# Patient Record
Sex: FEMALE | Race: WHITE | NOT HISPANIC OR LATINO | Employment: OTHER | URBAN - METROPOLITAN AREA
[De-identification: names, ages, dates, MRNs, and addresses within clinical notes are randomized per-mention and may not be internally consistent; named-entity substitution may affect disease eponyms.]

---

## 2024-01-12 PROBLEM — K44.9 HIATAL HERNIA WITH GASTROESOPHAGEAL REFLUX: Status: ACTIVE | Noted: 2024-01-12

## 2024-01-12 PROBLEM — K21.9 HIATAL HERNIA WITH GASTROESOPHAGEAL REFLUX: Status: ACTIVE | Noted: 2024-01-12

## 2024-01-16 ENCOUNTER — HOSPITAL ENCOUNTER (INPATIENT)
Facility: HOSPITAL | Age: 74
LOS: 3 days | Discharge: HOME | DRG: 392 | End: 2024-01-20
Attending: INTERNAL MEDICINE | Admitting: INTERNAL MEDICINE
Payer: MEDICARE

## 2024-01-16 ENCOUNTER — APPOINTMENT (OUTPATIENT)
Dept: CARDIOLOGY | Facility: HOSPITAL | Age: 74
DRG: 392 | End: 2024-01-16
Payer: MEDICARE

## 2024-01-16 ENCOUNTER — APPOINTMENT (OUTPATIENT)
Dept: RADIOLOGY | Facility: HOSPITAL | Age: 74
DRG: 392 | End: 2024-01-16
Payer: MEDICARE

## 2024-01-16 DIAGNOSIS — R53.1 GENERALIZED WEAKNESS: Primary | ICD-10-CM

## 2024-01-16 DIAGNOSIS — R55 COLLAPSE: ICD-10-CM

## 2024-01-16 DIAGNOSIS — K21.9 HIATAL HERNIA WITH GASTROESOPHAGEAL REFLUX: ICD-10-CM

## 2024-01-16 DIAGNOSIS — K44.9 HIATAL HERNIA WITH GASTROESOPHAGEAL REFLUX: ICD-10-CM

## 2024-01-16 LAB
ALBUMIN SERPL BCP-MCNC: 3.3 G/DL (ref 3.4–5)
ALP SERPL-CCNC: 91 U/L (ref 33–136)
ALT SERPL W P-5'-P-CCNC: 17 U/L (ref 7–45)
ANION GAP SERPL CALC-SCNC: 9 MMOL/L (ref 10–20)
APPEARANCE UR: CLEAR
APTT PPP: 38 SECONDS (ref 27–38)
AST SERPL W P-5'-P-CCNC: 26 U/L (ref 9–39)
BASOPHILS # BLD AUTO: 0.02 X10*3/UL (ref 0–0.1)
BASOPHILS NFR BLD AUTO: 0.4 %
BILIRUB SERPL-MCNC: 0.8 MG/DL (ref 0–1.2)
BILIRUB UR STRIP.AUTO-MCNC: NEGATIVE MG/DL
BNP SERPL-MCNC: 88 PG/ML (ref 0–99)
BUN SERPL-MCNC: 10 MG/DL (ref 6–23)
CALCIUM SERPL-MCNC: 8.7 MG/DL (ref 8.6–10.3)
CARDIAC TROPONIN I PNL SERPL HS: 10 NG/L (ref 0–13)
CARDIAC TROPONIN I PNL SERPL HS: 7 NG/L (ref 0–13)
CHLORIDE SERPL-SCNC: 108 MMOL/L (ref 98–107)
CO2 SERPL-SCNC: 24 MMOL/L (ref 21–32)
COLOR UR: YELLOW
CREAT SERPL-MCNC: 0.65 MG/DL (ref 0.5–1.05)
EGFRCR SERPLBLD CKD-EPI 2021: >90 ML/MIN/1.73M*2
EOSINOPHIL # BLD AUTO: 0.02 X10*3/UL (ref 0–0.4)
EOSINOPHIL NFR BLD AUTO: 0.4 %
ERYTHROCYTE [DISTWIDTH] IN BLOOD BY AUTOMATED COUNT: 12.9 % (ref 11.5–14.5)
GLUCOSE SERPL-MCNC: 92 MG/DL (ref 74–99)
GLUCOSE UR STRIP.AUTO-MCNC: NEGATIVE MG/DL
HCT VFR BLD AUTO: 37.1 % (ref 36–46)
HGB BLD-MCNC: 12.3 G/DL (ref 12–16)
HYALINE CASTS #/AREA URNS AUTO: ABNORMAL /LPF
IMM GRANULOCYTES # BLD AUTO: 0.01 X10*3/UL (ref 0–0.5)
IMM GRANULOCYTES NFR BLD AUTO: 0.2 % (ref 0–0.9)
INR PPP: 1.1 (ref 0.9–1.1)
KETONES UR STRIP.AUTO-MCNC: NEGATIVE MG/DL
LEUKOCYTE ESTERASE UR QL STRIP.AUTO: ABNORMAL
LYMPHOCYTES # BLD AUTO: 1.68 X10*3/UL (ref 0.8–3)
LYMPHOCYTES NFR BLD AUTO: 31.1 %
MCH RBC QN AUTO: 31.4 PG (ref 26–34)
MCHC RBC AUTO-ENTMCNC: 33.2 G/DL (ref 32–36)
MCV RBC AUTO: 95 FL (ref 80–100)
MONOCYTES # BLD AUTO: 0.43 X10*3/UL (ref 0.05–0.8)
MONOCYTES NFR BLD AUTO: 7.9 %
MUCOUS THREADS #/AREA URNS AUTO: ABNORMAL /LPF
NEUTROPHILS # BLD AUTO: 3.25 X10*3/UL (ref 1.6–5.5)
NEUTROPHILS NFR BLD AUTO: 60 %
NITRITE UR QL STRIP.AUTO: NEGATIVE
NRBC BLD-RTO: 0 /100 WBCS (ref 0–0)
PH UR STRIP.AUTO: 5 [PH]
PLATELET # BLD AUTO: 216 X10*3/UL (ref 150–450)
POTASSIUM SERPL-SCNC: 3.9 MMOL/L (ref 3.5–5.3)
PROT SERPL-MCNC: 5.8 G/DL (ref 6.4–8.2)
PROT UR STRIP.AUTO-MCNC: NEGATIVE MG/DL
PROTHROMBIN TIME: 12.5 SECONDS (ref 9.8–12.8)
RBC # BLD AUTO: 3.92 X10*6/UL (ref 4–5.2)
RBC # UR STRIP.AUTO: NEGATIVE /UL
RBC #/AREA URNS AUTO: ABNORMAL /HPF
SODIUM SERPL-SCNC: 137 MMOL/L (ref 136–145)
SP GR UR STRIP.AUTO: 1.02
UROBILINOGEN UR STRIP.AUTO-MCNC: <2 MG/DL
WBC # BLD AUTO: 5.4 X10*3/UL (ref 4.4–11.3)
WBC #/AREA URNS AUTO: ABNORMAL /HPF

## 2024-01-16 PROCEDURE — 36415 COLL VENOUS BLD VENIPUNCTURE: CPT | Performed by: PHYSICIAN ASSISTANT

## 2024-01-16 PROCEDURE — 96365 THER/PROPH/DIAG IV INF INIT: CPT

## 2024-01-16 PROCEDURE — 81001 URINALYSIS AUTO W/SCOPE: CPT | Performed by: PHYSICIAN ASSISTANT

## 2024-01-16 PROCEDURE — 80053 COMPREHEN METABOLIC PANEL: CPT | Performed by: PHYSICIAN ASSISTANT

## 2024-01-16 PROCEDURE — 87086 URINE CULTURE/COLONY COUNT: CPT | Mod: PARLAB | Performed by: PHYSICIAN ASSISTANT

## 2024-01-16 PROCEDURE — 2550000001 HC RX 255 CONTRASTS: Performed by: INTERNAL MEDICINE

## 2024-01-16 PROCEDURE — 2500000004 HC RX 250 GENERAL PHARMACY W/ HCPCS (ALT 636 FOR OP/ED): Performed by: PHYSICIAN ASSISTANT

## 2024-01-16 PROCEDURE — 2500000001 HC RX 250 WO HCPCS SELF ADMINISTERED DRUGS (ALT 637 FOR MEDICARE OP): Performed by: PHYSICIAN ASSISTANT

## 2024-01-16 PROCEDURE — 71275 CT ANGIOGRAPHY CHEST: CPT | Performed by: RADIOLOGY

## 2024-01-16 PROCEDURE — 85730 THROMBOPLASTIN TIME PARTIAL: CPT | Performed by: PHYSICIAN ASSISTANT

## 2024-01-16 PROCEDURE — 96375 TX/PRO/DX INJ NEW DRUG ADDON: CPT

## 2024-01-16 PROCEDURE — 99222 1ST HOSP IP/OBS MODERATE 55: CPT | Performed by: PHYSICIAN ASSISTANT

## 2024-01-16 PROCEDURE — 74177 CT ABD & PELVIS W/CONTRAST: CPT | Performed by: RADIOLOGY

## 2024-01-16 PROCEDURE — 99285 EMERGENCY DEPT VISIT HI MDM: CPT | Performed by: INTERNAL MEDICINE

## 2024-01-16 PROCEDURE — 93005 ELECTROCARDIOGRAM TRACING: CPT

## 2024-01-16 PROCEDURE — 84484 ASSAY OF TROPONIN QUANT: CPT | Performed by: PHYSICIAN ASSISTANT

## 2024-01-16 PROCEDURE — G0378 HOSPITAL OBSERVATION PER HR: HCPCS

## 2024-01-16 PROCEDURE — 85025 COMPLETE CBC W/AUTO DIFF WBC: CPT | Performed by: PHYSICIAN ASSISTANT

## 2024-01-16 PROCEDURE — 83880 ASSAY OF NATRIURETIC PEPTIDE: CPT | Performed by: PHYSICIAN ASSISTANT

## 2024-01-16 PROCEDURE — 74177 CT ABD & PELVIS W/CONTRAST: CPT

## 2024-01-16 PROCEDURE — 85610 PROTHROMBIN TIME: CPT | Performed by: PHYSICIAN ASSISTANT

## 2024-01-16 PROCEDURE — 71275 CT ANGIOGRAPHY CHEST: CPT

## 2024-01-16 RX ORDER — ONDANSETRON HYDROCHLORIDE 2 MG/ML
4 INJECTION, SOLUTION INTRAVENOUS EVERY 8 HOURS PRN
Status: DISCONTINUED | OUTPATIENT
Start: 2024-01-16 | End: 2024-01-20 | Stop reason: HOSPADM

## 2024-01-16 RX ORDER — IBUPROFEN 200 MG
1 TABLET ORAL DAILY
Status: DISCONTINUED | OUTPATIENT
Start: 2024-01-17 | End: 2024-01-20 | Stop reason: HOSPADM

## 2024-01-16 RX ORDER — CEFTRIAXONE 1 G/50ML
1 INJECTION, SOLUTION INTRAVENOUS EVERY 24 HOURS
Status: DISCONTINUED | OUTPATIENT
Start: 2024-01-16 | End: 2024-01-17

## 2024-01-16 RX ORDER — ENOXAPARIN SODIUM 100 MG/ML
40 INJECTION SUBCUTANEOUS EVERY 24 HOURS
Status: DISCONTINUED | OUTPATIENT
Start: 2024-01-16 | End: 2024-01-20 | Stop reason: HOSPADM

## 2024-01-16 RX ORDER — PANTOPRAZOLE SODIUM 40 MG/10ML
40 INJECTION, POWDER, LYOPHILIZED, FOR SOLUTION INTRAVENOUS
Status: DISCONTINUED | OUTPATIENT
Start: 2024-01-17 | End: 2024-01-17

## 2024-01-16 RX ORDER — ACETAMINOPHEN 160 MG/5ML
650 SOLUTION ORAL EVERY 4 HOURS PRN
Status: DISCONTINUED | OUTPATIENT
Start: 2024-01-16 | End: 2024-01-19

## 2024-01-16 RX ORDER — ALUMINUM HYDROXIDE, MAGNESIUM HYDROXIDE, AND SIMETHICONE 1200; 120; 1200 MG/30ML; MG/30ML; MG/30ML
10 SUSPENSION ORAL ONCE
Status: COMPLETED | OUTPATIENT
Start: 2024-01-16 | End: 2024-01-16

## 2024-01-16 RX ORDER — FAMOTIDINE 10 MG/ML
40 INJECTION INTRAVENOUS ONCE
Status: COMPLETED | OUTPATIENT
Start: 2024-01-16 | End: 2024-01-16

## 2024-01-16 RX ORDER — ACETAMINOPHEN 650 MG/1
650 SUPPOSITORY RECTAL EVERY 4 HOURS PRN
Status: DISCONTINUED | OUTPATIENT
Start: 2024-01-16 | End: 2024-01-19

## 2024-01-16 RX ORDER — ACETAMINOPHEN 325 MG/1
650 TABLET ORAL EVERY 4 HOURS PRN
Status: DISCONTINUED | OUTPATIENT
Start: 2024-01-16 | End: 2024-01-19

## 2024-01-16 RX ORDER — MORPHINE SULFATE 4 MG/ML
4 INJECTION, SOLUTION INTRAMUSCULAR; INTRAVENOUS ONCE
Status: COMPLETED | OUTPATIENT
Start: 2024-01-16 | End: 2024-01-16

## 2024-01-16 RX ORDER — MORPHINE SULFATE 2 MG/ML
2 INJECTION, SOLUTION INTRAMUSCULAR; INTRAVENOUS EVERY 4 HOURS PRN
Status: DISCONTINUED | OUTPATIENT
Start: 2024-01-16 | End: 2024-01-19

## 2024-01-16 RX ORDER — POLYETHYLENE GLYCOL 3350 17 G/17G
17 POWDER, FOR SOLUTION ORAL DAILY PRN
Status: DISCONTINUED | OUTPATIENT
Start: 2024-01-16 | End: 2024-01-20 | Stop reason: HOSPADM

## 2024-01-16 RX ORDER — SODIUM CHLORIDE 9 MG/ML
125 INJECTION, SOLUTION INTRAVENOUS CONTINUOUS
Status: DISCONTINUED | OUTPATIENT
Start: 2024-01-16 | End: 2024-01-17

## 2024-01-16 RX ORDER — ONDANSETRON 4 MG/1
4 TABLET, FILM COATED ORAL EVERY 8 HOURS PRN
Status: DISCONTINUED | OUTPATIENT
Start: 2024-01-16 | End: 2024-01-20 | Stop reason: HOSPADM

## 2024-01-16 RX ADMIN — ALUMINUM HYDROXIDE, MAGNESIUM HYDROXIDE, AND SIMETHICONE 10 ML: 200; 200; 20 SUSPENSION ORAL at 21:34

## 2024-01-16 RX ADMIN — MORPHINE SULFATE 4 MG: 4 INJECTION, SOLUTION INTRAMUSCULAR; INTRAVENOUS at 18:03

## 2024-01-16 RX ADMIN — CEFTRIAXONE SODIUM 1 G: 1 INJECTION, SOLUTION INTRAVENOUS at 22:39

## 2024-01-16 RX ADMIN — IOHEXOL 75 ML: 350 INJECTION, SOLUTION INTRAVENOUS at 18:32

## 2024-01-16 RX ADMIN — SODIUM CHLORIDE 125 ML/HR: 9 INJECTION, SOLUTION INTRAVENOUS at 19:20

## 2024-01-16 RX ADMIN — MORPHINE SULFATE 4 MG: 4 INJECTION, SOLUTION INTRAMUSCULAR; INTRAVENOUS at 21:32

## 2024-01-16 RX ADMIN — FAMOTIDINE 40 MG: 10 INJECTION, SOLUTION INTRAVENOUS at 19:25

## 2024-01-16 ASSESSMENT — COLUMBIA-SUICIDE SEVERITY RATING SCALE - C-SSRS
6. HAVE YOU EVER DONE ANYTHING, STARTED TO DO ANYTHING, OR PREPARED TO DO ANYTHING TO END YOUR LIFE?: NO
1. IN THE PAST MONTH, HAVE YOU WISHED YOU WERE DEAD OR WISHED YOU COULD GO TO SLEEP AND NOT WAKE UP?: NO
2. HAVE YOU ACTUALLY HAD ANY THOUGHTS OF KILLING YOURSELF?: NO

## 2024-01-16 ASSESSMENT — LIFESTYLE VARIABLES
HAVE YOU EVER FELT YOU SHOULD CUT DOWN ON YOUR DRINKING: NO
HAVE PEOPLE ANNOYED YOU BY CRITICIZING YOUR DRINKING: NO
EVER FELT BAD OR GUILTY ABOUT YOUR DRINKING: NO
EVER HAD A DRINK FIRST THING IN THE MORNING TO STEADY YOUR NERVES TO GET RID OF A HANGOVER: NO
REASON UNABLE TO ASSESS: NO

## 2024-01-16 ASSESSMENT — PAIN - FUNCTIONAL ASSESSMENT: PAIN_FUNCTIONAL_ASSESSMENT: 0-10

## 2024-01-16 ASSESSMENT — PAIN DESCRIPTION - PROGRESSION: CLINICAL_PROGRESSION: NOT CHANGED

## 2024-01-16 ASSESSMENT — PAIN SCALES - GENERAL: PAINLEVEL_OUTOF10: 9

## 2024-01-16 NOTE — ED TRIAGE NOTES
Pt to ED for weakness and dizziness after walking long distances; at baseline, pt ambulates with rollator, but was not using it today

## 2024-01-16 NOTE — ED PROVIDER NOTES
EMERGENCY MEDICINE EVALUATION NOTE    History of Present Illness     Chief Complaint:   Chief Complaint   Patient presents with    Weakness, Gen       HPI: Jada Marlow is a 73 y.o. female presents with a chief complaint of generalized weakness.  Patient reports that she has a history of a hiatal hernia and was scheduled to follow-up with Dr. Altman.  She states that she was in the process of trying to find his office when she collapsed.  She states that she walked from one building back to the main hospital back to another building back to the [facility] hospital and attempted try to find his office and during that she became so weak she collapsed.  She reports that she thinks hiatal hernia was the cause of her symptoms.  She states that she has not been able to eat or drink properly for the last 3 weeks due to this.  She says anytime she drinks anything she gets a pain and feels very nauseous.  Patient reports that currently she does have a little pain in her sternum but she thinks it might be from collapsing.      Previous History   History reviewed. No pertinent past medical history.  History reviewed. No pertinent surgical history.     No family history on file.  Allergies   Allergen Reactions    Aspirin Unknown    Vancomycin Unknown     No current outpatient medications    Physical Exam     Appearance: Alert, oriented , cooperative     Skin: Intact,  dry skin, no lesions, rash, petechiae or purpura.      Eyes: PERRLA, EOMs intact,  Conjunctiva pink      ENT: Hearing grossly intact. Pharynx clear     Neck: Supple. Trachea at midline.      Pulmonary: Clear bilaterally. No rales, rhonchi or wheezing. No accessory muscle use or stridor.     Cardiac: Normal rate and rhythm without murmur.  Tender across anterior chest wall.     Abdomen: Soft, active bowel sounds.  Epigastric tenderness.     Musculoskeletal: Full range of motion. no pain, edema, or deformity.      Neurological:Cranial nerves II through XII are grossly  intact, normal sensation, no weakness, no focal findings identified.     Results     Labs Reviewed   CBC WITH AUTO DIFFERENTIAL - Abnormal       Result Value    WBC 5.4      nRBC 0.0      RBC 3.92 (*)     Hemoglobin 12.3      Hematocrit 37.1      MCV 95      MCH 31.4      MCHC 33.2      RDW 12.9      Platelets 216      Neutrophils % 60.0      Immature Granulocytes %, Automated 0.2      Lymphocytes % 31.1      Monocytes % 7.9      Eosinophils % 0.4      Basophils % 0.4      Neutrophils Absolute 3.25      Immature Granulocytes Absolute, Automated 0.01      Lymphocytes Absolute 1.68      Monocytes Absolute 0.43      Eosinophils Absolute 0.02      Basophils Absolute 0.02     COMPREHENSIVE METABOLIC PANEL - Abnormal    Glucose 92      Sodium 137      Potassium 3.9      Chloride 108 (*)     Bicarbonate 24      Anion Gap 9 (*)     Urea Nitrogen 10      Creatinine 0.65      eGFR >90      Calcium 8.7      Albumin 3.3 (*)     Alkaline Phosphatase 91      Total Protein 5.8 (*)     AST 26      Bilirubin, Total 0.8      ALT 17     URINALYSIS WITH REFLEX CULTURE AND MICROSCOPIC - Abnormal    Color, Urine Yellow      Appearance, Urine Clear      Specific Gravity, Urine 1.017      pH, Urine 5.0      Protein, Urine NEGATIVE      Glucose, Urine NEGATIVE      Blood, Urine NEGATIVE      Ketones, Urine NEGATIVE      Bilirubin, Urine NEGATIVE      Urobilinogen, Urine <2.0      Nitrite, Urine NEGATIVE      Leukocyte Esterase, Urine TRACE (*)    MICROSCOPIC ONLY, URINE - Abnormal    WBC, Urine 1-5      RBC, Urine 1-2      Mucus, Urine 1+      Hyaline Casts, Urine 1+ (*)    PROTIME-INR - Normal    Protime 12.5      INR 1.1     APTT - Normal    aPTT 38      Narrative:     The APTT is no longer used for monitoring Unfractionated Heparin Therapy. For monitoring Heparin Therapy, use the Heparin Assay.   TROPONIN I, HIGH SENSITIVITY - Normal    Troponin I, High Sensitivity 7      Narrative:     Less than 99th percentile of normal range  cutoff-  Female and children under 18 years old <14 ng/L; Male <21 ng/L: Negative  Repeat testing should be performed if clinically indicated.     Female and children under 18 years old 14-50 ng/L; Male 21-50 ng/L:  Consistent with possible cardiac damage and possible increased clinical   risk. Serial measurements may help to assess extent of myocardial damage.     >50 ng/L: Consistent with cardiac damage, increased clinical risk and  myocardial infarction. Serial measurements may help assess extent of   myocardial damage.      NOTE: Children less than 1 year old may have higher baseline troponin   levels and results should be interpreted in conjunction with the overall   clinical context.     NOTE: Troponin I testing is performed using a different   testing methodology at [facility] than at other   [facility]. Direct result comparisons should only   be made within the same method.   B-TYPE NATRIURETIC PEPTIDE - Normal    BNP 88      Narrative:        <100 pg/mL - Heart failure unlikely  100-299 pg/mL - Intermediate probability of acute heart                  failure exacerbation. Correlate with clinical                  context and patient history.    >=300 pg/mL - Heart Failure likely. Correlate with clinical                  context and patient history.    BNP testing is performed using different testing methodology at [facility] than at other [facility]. Direct result comparisons should only be made within the same method.      URINE CULTURE   URINALYSIS WITH REFLEX CULTURE AND MICROSCOPIC    Narrative:     The following orders were created for panel order Urinalysis with Reflex Culture and Microscopic.  Procedure                               Abnormality         Status                     ---------                               -----------         ------                     Urinalysis with Reflex Cu...[[id number]]  Abnormal            Final result               Extra Urine  Gooden Tube[[id number]]                            In process                   Please view results for these tests on the individual orders.   EXTRA URINE GRAY TUBE   TROPONIN I, HIGH SENSITIVITY     CT abdomen pelvis w IV contrast   Final Result   Gastric wall thickening suspicious for gastritis given the stranding   along the anterior aspect of the body. This may be further assessed   with endoscopy as warranted.        Colonic diverticulosis.        Small amount of gallbladder sludge.        Signed by: Marlo Wright 1/16/2024 6:59 PM   Dictation workstation:   [id number]      CT angio chest for pulmonary embolism   Final Result   No evidence of pulmonary embolism.        Scattered atelectasis.        Small pulmonary nodules measuring up to 3 mm. No further follow-up is   required, however, if the patient has high risk factors for primary   lung malignancy, follow-up noncontrast CT scan chest in 12 months may   be obtained. (Jose Hairston al., Guidelines for management of   incidental pulmonary nodules detected on CT images: From the   Fleischner Society 2017, Radiology. 2017 Jul;284 (1):228-243.)        Signed by: Marlo Wright 1/16/2024 7:01 PM   Dictation workstation:   [id number]            ED Course & Medical Decision Making     Medications   sodium chloride 0.9% infusion (125 mL/hr intravenous New Bag 1/16/24 1920)   morphine injection 4 mg (4 mg intravenous Given 1/16/24 1803)   iohexol (OMNIPaque) 350 mg iodine/mL solution 75 mL (75 mL intravenous Given 1/16/24 1832)   famotidine PF (Pepcid) injection 40 mg (40 mg intravenous Given 1/16/24 1925)     Heart Rate:  [91]   Temp:  [37.3 °C (99.1 °F)]   Resp:  [17]   BP: (152)/(67)   Height:  [152.4 cm (5')]   Weight:  [45.4 kg (100 lb)]   SpO2:  [96 %]    ED Course as of 01/16/24 2029 Tue Jan 16, 2024 1921 Patient was updated on results by attending physician.  At this time patient's workup does not show any real significant abnormalities.   Patient had negative CT PE study and only had gastritis present on CT imaging.  Patient's EKG does not show any ischemic changes.  Patient's initial troponin was negative but a delta troponin will be ordered.  Remainder patient's blood work was relatively normal.  Patient does not appear to be acutely dehydrated as she does not have an ASMANTHA.  Due to patient being unable to tolerate p.o. and having a collapsing/syncopal-like episode patient will be brought into the ER.  There is no gross approve that she did not actually was consciousness only or worsening she did not.  Patient in agreement with the plan of care being admitted.  Patient was started on normal saline drip at this time.  Patient unsure PCP so patient will be admitted to no doc. [CJ]      ED Course User Index  [CJ] Mickey Berrios PA-C         Diagnoses as of 01/16/24 2029   Generalized weakness   Collapse     Jada Marlow is a 73 y.o. female presents with a chief complaint of generalized weakness.  Patient reports that she has a history of a hiatal hernia and was scheduled to follow-up with Dr. Altman.  She states that she was in the process of trying to find his office when she collapsed.  She states that she walked from one building back to the main hospital back to another building back to the [facility] and attempted try to find his office and during that she became so weak she collapsed.  She reports that she thinks hiatal hernia was the cause of her symptoms.  She states that she has not been able to eat or drink properly for the last 3 weeks due to this.  She says anytime she drinks anything she gets a pain and feels very nauseous.  Patient reports that currently she does have a little pain in her sternum but she thinks it might be from collapsing.  Patient had a workup today did not show any acute abnormalities.  Patient's urinalysis was negative for UTI.  Patient had negative troponin and negative BNP.  Patient did have CT scans of the  chest abdomen and pelvis.  Negative for CT PE, negative CT of the abdomen pelvis may be a little gastritis present.  Patient was given IV Pepcid.  Patient be admitted for generalized weakness and collapse.  Spoke to Dr. Mayfield about the patient who agreed to admit.    Procedures   ECG 12 lead    Performed by: Mickey Berrios PA-C  Authorized by: Mickey Berrios PA-C    ECG interpreted by ED Physician in the absence of a cardiologist: yes    Rate:     ECG rate:  83  Rhythm:     Rhythm: sinus rhythm    Ectopy:     Ectopy: none    ST segments:     ST segments:  Normal  Comments:      No STEMI      Diagnosis     1. Generalized weakness    2. Collapse        Disposition   Admit for observation    ED Prescriptions    None         Disclaimer: This note was dictated by speech recognition. Minor errors in transcription may be present. Please call if questions.       Mickey Berrios PA-C  01/16/24 2029

## 2024-01-17 PROBLEM — R53.1 GENERALIZED WEAKNESS: Status: ACTIVE | Noted: 2024-01-17

## 2024-01-17 LAB
ANION GAP SERPL CALC-SCNC: 9 MMOL/L (ref 10–20)
BACTERIA UR CULT: NORMAL
BUN SERPL-MCNC: 6 MG/DL (ref 6–23)
CALCIUM SERPL-MCNC: 7.6 MG/DL (ref 8.6–10.3)
CHLORIDE SERPL-SCNC: 111 MMOL/L (ref 98–107)
CO2 SERPL-SCNC: 24 MMOL/L (ref 21–32)
CREAT SERPL-MCNC: 0.53 MG/DL (ref 0.5–1.05)
EGFRCR SERPLBLD CKD-EPI 2021: >90 ML/MIN/1.73M*2
ERYTHROCYTE [DISTWIDTH] IN BLOOD BY AUTOMATED COUNT: 12.8 % (ref 11.5–14.5)
GLUCOSE SERPL-MCNC: 89 MG/DL (ref 74–99)
HCT VFR BLD AUTO: 35.3 % (ref 36–46)
HGB BLD-MCNC: 11.5 G/DL (ref 12–16)
HOLD SPECIMEN: NORMAL
MCH RBC QN AUTO: 31.2 PG (ref 26–34)
MCHC RBC AUTO-ENTMCNC: 32.6 G/DL (ref 32–36)
MCV RBC AUTO: 96 FL (ref 80–100)
NRBC BLD-RTO: 0 /100 WBCS (ref 0–0)
PLATELET # BLD AUTO: 187 X10*3/UL (ref 150–450)
POTASSIUM SERPL-SCNC: 4 MMOL/L (ref 3.5–5.3)
RBC # BLD AUTO: 3.69 X10*6/UL (ref 4–5.2)
SODIUM SERPL-SCNC: 140 MMOL/L (ref 136–145)
WBC # BLD AUTO: 4.3 X10*3/UL (ref 4.4–11.3)

## 2024-01-17 PROCEDURE — S4991 NICOTINE PATCH NONLEGEND: HCPCS | Performed by: PHYSICIAN ASSISTANT

## 2024-01-17 PROCEDURE — 2500000004 HC RX 250 GENERAL PHARMACY W/ HCPCS (ALT 636 FOR OP/ED): Performed by: PHYSICIAN ASSISTANT

## 2024-01-17 PROCEDURE — 2500000002 HC RX 250 W HCPCS SELF ADMINISTERED DRUGS (ALT 637 FOR MEDICARE OP, ALT 636 FOR OP/ED): Performed by: PHYSICIAN ASSISTANT

## 2024-01-17 PROCEDURE — 2500000001 HC RX 250 WO HCPCS SELF ADMINISTERED DRUGS (ALT 637 FOR MEDICARE OP): Performed by: INTERNAL MEDICINE

## 2024-01-17 PROCEDURE — 2500000004 HC RX 250 GENERAL PHARMACY W/ HCPCS (ALT 636 FOR OP/ED): Performed by: INTERNAL MEDICINE

## 2024-01-17 PROCEDURE — 97161 PT EVAL LOW COMPLEX 20 MIN: CPT | Mod: GP

## 2024-01-17 PROCEDURE — 99223 1ST HOSP IP/OBS HIGH 75: CPT | Performed by: NURSE PRACTITIONER

## 2024-01-17 PROCEDURE — 80048 BASIC METABOLIC PNL TOTAL CA: CPT | Performed by: PHYSICIAN ASSISTANT

## 2024-01-17 PROCEDURE — 97165 OT EVAL LOW COMPLEX 30 MIN: CPT | Mod: GO

## 2024-01-17 PROCEDURE — C9113 INJ PANTOPRAZOLE SODIUM, VIA: HCPCS | Performed by: PHYSICIAN ASSISTANT

## 2024-01-17 PROCEDURE — 1200000002 HC GENERAL ROOM WITH TELEMETRY DAILY

## 2024-01-17 PROCEDURE — 36415 COLL VENOUS BLD VENIPUNCTURE: CPT | Performed by: PHYSICIAN ASSISTANT

## 2024-01-17 PROCEDURE — 85027 COMPLETE CBC AUTOMATED: CPT | Performed by: PHYSICIAN ASSISTANT

## 2024-01-17 RX ORDER — QUETIAPINE FUMARATE 25 MG/1
25 TABLET, FILM COATED ORAL 4 TIMES DAILY
Status: DISCONTINUED | OUTPATIENT
Start: 2024-01-17 | End: 2024-01-20 | Stop reason: HOSPADM

## 2024-01-17 RX ORDER — ATORVASTATIN CALCIUM 40 MG/1
40 TABLET, FILM COATED ORAL DAILY
Status: DISCONTINUED | OUTPATIENT
Start: 2024-01-17 | End: 2024-01-20 | Stop reason: HOSPADM

## 2024-01-17 RX ORDER — LOPERAMIDE HYDROCHLORIDE 2 MG/1
2 CAPSULE ORAL 4 TIMES DAILY PRN
Status: DISCONTINUED | OUTPATIENT
Start: 2024-01-17 | End: 2024-01-20 | Stop reason: HOSPADM

## 2024-01-17 RX ORDER — ALBUTEROL SULFATE 90 UG/1
2 AEROSOL, METERED RESPIRATORY (INHALATION) EVERY 6 HOURS PRN
COMMUNITY

## 2024-01-17 RX ORDER — PANTOPRAZOLE SODIUM 40 MG/1
40 TABLET, DELAYED RELEASE ORAL
Status: DISCONTINUED | OUTPATIENT
Start: 2024-01-17 | End: 2024-01-17

## 2024-01-17 RX ORDER — TRAZODONE HYDROCHLORIDE 50 MG/1
150 TABLET ORAL NIGHTLY
Status: DISCONTINUED | OUTPATIENT
Start: 2024-01-17 | End: 2024-01-20 | Stop reason: HOSPADM

## 2024-01-17 RX ORDER — PANTOPRAZOLE SODIUM 40 MG/1
40 TABLET, DELAYED RELEASE ORAL
Status: DISCONTINUED | OUTPATIENT
Start: 2024-01-18 | End: 2024-01-19

## 2024-01-17 RX ORDER — GUAIFENESIN 600 MG/1
600 TABLET, EXTENDED RELEASE ORAL 2 TIMES DAILY PRN
Status: DISCONTINUED | OUTPATIENT
Start: 2024-01-17 | End: 2024-01-20 | Stop reason: HOSPADM

## 2024-01-17 RX ORDER — TIZANIDINE 4 MG/1
4 TABLET ORAL EVERY 8 HOURS
Status: DISCONTINUED | OUTPATIENT
Start: 2024-01-17 | End: 2024-01-20 | Stop reason: HOSPADM

## 2024-01-17 RX ORDER — POLYETHYLENE GLYCOL 3350 17 G/17G
17 POWDER, FOR SOLUTION ORAL DAILY
COMMUNITY

## 2024-01-17 RX ORDER — ACETAMINOPHEN 500 MG
1000 TABLET ORAL EVERY 6 HOURS
COMMUNITY

## 2024-01-17 RX ORDER — FLUOXETINE HYDROCHLORIDE 20 MG/1
20 CAPSULE ORAL DAILY
COMMUNITY

## 2024-01-17 RX ORDER — SENNOSIDES 8.6 MG/1
1 TABLET ORAL 2 TIMES DAILY
COMMUNITY

## 2024-01-17 RX ORDER — CETIRIZINE HYDROCHLORIDE 10 MG/1
10 TABLET, CHEWABLE ORAL DAILY
COMMUNITY

## 2024-01-17 RX ORDER — QUETIAPINE FUMARATE 25 MG/1
25 TABLET, FILM COATED ORAL 4 TIMES DAILY
COMMUNITY

## 2024-01-17 RX ORDER — LOPERAMIDE HYDROCHLORIDE 2 MG/1
2 CAPSULE ORAL 4 TIMES DAILY PRN
COMMUNITY

## 2024-01-17 RX ORDER — LEVOTHYROXINE SODIUM 150 UG/1
150 TABLET ORAL
COMMUNITY

## 2024-01-17 RX ORDER — OMEPRAZOLE 20 MG/1
20 CAPSULE, DELAYED RELEASE ORAL
COMMUNITY
End: 2024-01-20 | Stop reason: HOSPADM

## 2024-01-17 RX ORDER — ALBUTEROL SULFATE 90 UG/1
2 AEROSOL, METERED RESPIRATORY (INHALATION) EVERY 6 HOURS PRN
Status: DISCONTINUED | OUTPATIENT
Start: 2024-01-17 | End: 2024-01-19

## 2024-01-17 RX ORDER — LEVOTHYROXINE SODIUM 150 UG/1
150 TABLET ORAL DAILY
Status: DISCONTINUED | OUTPATIENT
Start: 2024-01-17 | End: 2024-01-20 | Stop reason: HOSPADM

## 2024-01-17 RX ORDER — TRAZODONE HYDROCHLORIDE 50 MG/1
100 TABLET ORAL NIGHTLY
Status: DISCONTINUED | OUTPATIENT
Start: 2024-01-17 | End: 2024-01-17

## 2024-01-17 RX ORDER — FLUOXETINE HYDROCHLORIDE 20 MG/1
20 CAPSULE ORAL DAILY
Status: DISCONTINUED | OUTPATIENT
Start: 2024-01-17 | End: 2024-01-20 | Stop reason: HOSPADM

## 2024-01-17 RX ORDER — PANTOPRAZOLE SODIUM 40 MG/1
40 TABLET, DELAYED RELEASE ORAL
Status: DISCONTINUED | OUTPATIENT
Start: 2024-01-18 | End: 2024-01-17

## 2024-01-17 RX ORDER — ONDANSETRON 4 MG/1
4 TABLET, ORALLY DISINTEGRATING ORAL EVERY 8 HOURS PRN
COMMUNITY

## 2024-01-17 RX ORDER — LORATADINE 10 MG/1
10 TABLET ORAL DAILY
Status: DISCONTINUED | OUTPATIENT
Start: 2024-01-17 | End: 2024-01-20 | Stop reason: HOSPADM

## 2024-01-17 RX ORDER — GUAIFENESIN 600 MG/1
600 TABLET, EXTENDED RELEASE ORAL EVERY 6 HOURS PRN
COMMUNITY

## 2024-01-17 RX ORDER — TRAZODONE HYDROCHLORIDE 150 MG/1
150 TABLET ORAL NIGHTLY
COMMUNITY

## 2024-01-17 RX ORDER — TIZANIDINE HYDROCHLORIDE 4 MG/1
4 CAPSULE, GELATIN COATED ORAL 3 TIMES DAILY
COMMUNITY

## 2024-01-17 RX ORDER — OXYBUTYNIN CHLORIDE 5 MG/1
5 TABLET, EXTENDED RELEASE ORAL DAILY
COMMUNITY

## 2024-01-17 RX ORDER — ATORVASTATIN CALCIUM 40 MG/1
40 TABLET, FILM COATED ORAL DAILY
COMMUNITY

## 2024-01-17 RX ADMIN — MORPHINE SULFATE 2 MG: 2 INJECTION, SOLUTION INTRAMUSCULAR; INTRAVENOUS at 12:44

## 2024-01-17 RX ADMIN — Medication 1 PATCH: at 08:04

## 2024-01-17 RX ADMIN — PANTOPRAZOLE SODIUM 40 MG: 40 INJECTION, POWDER, FOR SOLUTION INTRAVENOUS at 08:04

## 2024-01-17 RX ADMIN — LORATADINE 10 MG: 10 TABLET ORAL at 08:57

## 2024-01-17 RX ADMIN — ACETAMINOPHEN 650 MG: 325 TABLET ORAL at 07:59

## 2024-01-17 RX ADMIN — MORPHINE SULFATE 2 MG: 2 INJECTION, SOLUTION INTRAMUSCULAR; INTRAVENOUS at 06:03

## 2024-01-17 RX ADMIN — QUETIAPINE FUMARATE 25 MG: 25 TABLET ORAL at 19:59

## 2024-01-17 RX ADMIN — SODIUM CHLORIDE 125 ML/HR: 9 INJECTION, SOLUTION INTRAVENOUS at 04:33

## 2024-01-17 RX ADMIN — ATORVASTATIN CALCIUM 40 MG: 40 TABLET, FILM COATED ORAL at 08:56

## 2024-01-17 RX ADMIN — TIZANIDINE 4 MG: 4 TABLET ORAL at 16:32

## 2024-01-17 RX ADMIN — ENOXAPARIN SODIUM 40 MG: 40 INJECTION SUBCUTANEOUS at 08:04

## 2024-01-17 RX ADMIN — TRAZODONE HYDROCHLORIDE 150 MG: 50 TABLET ORAL at 19:59

## 2024-01-17 RX ADMIN — QUETIAPINE FUMARATE 25 MG: 25 TABLET ORAL at 12:45

## 2024-01-17 RX ADMIN — TIZANIDINE 4 MG: 4 TABLET ORAL at 08:56

## 2024-01-17 RX ADMIN — QUETIAPINE FUMARATE 25 MG: 25 TABLET ORAL at 16:32

## 2024-01-17 RX ADMIN — SODIUM CHLORIDE 125 ML/HR: 9 INJECTION, SOLUTION INTRAVENOUS at 12:44

## 2024-01-17 RX ADMIN — ONDANSETRON 4 MG: 2 INJECTION INTRAMUSCULAR; INTRAVENOUS at 19:58

## 2024-01-17 RX ADMIN — QUETIAPINE FUMARATE 25 MG: 25 TABLET ORAL at 08:56

## 2024-01-17 RX ADMIN — MORPHINE SULFATE 2 MG: 2 INJECTION, SOLUTION INTRAMUSCULAR; INTRAVENOUS at 19:58

## 2024-01-17 RX ADMIN — LEVOTHYROXINE SODIUM 150 MCG: 150 TABLET ORAL at 08:57

## 2024-01-17 RX ADMIN — FLUOXETINE HYDROCHLORIDE 20 MG: 20 CAPSULE ORAL at 08:56

## 2024-01-17 SDOH — SOCIAL STABILITY: SOCIAL INSECURITY: WERE YOU ABLE TO COMPLETE ALL THE BEHAVIORAL HEALTH SCREENINGS?: YES

## 2024-01-17 SDOH — SOCIAL STABILITY: SOCIAL INSECURITY: ARE YOU OR HAVE YOU BEEN THREATENED OR ABUSED PHYSICALLY, EMOTIONALLY, OR SEXUALLY BY ANYONE?: NO

## 2024-01-17 SDOH — SOCIAL STABILITY: SOCIAL INSECURITY: DO YOU FEEL ANYONE HAS EXPLOITED OR TAKEN ADVANTAGE OF YOU FINANCIALLY OR OF YOUR PERSONAL PROPERTY?: NO

## 2024-01-17 SDOH — SOCIAL STABILITY: SOCIAL INSECURITY: DOES ANYONE TRY TO KEEP YOU FROM HAVING/CONTACTING OTHER FRIENDS OR DOING THINGS OUTSIDE YOUR HOME?: NO

## 2024-01-17 SDOH — SOCIAL STABILITY: SOCIAL INSECURITY: DO YOU FEEL UNSAFE GOING BACK TO THE PLACE WHERE YOU ARE LIVING?: NO

## 2024-01-17 SDOH — SOCIAL STABILITY: SOCIAL INSECURITY: ABUSE: ADULT

## 2024-01-17 SDOH — SOCIAL STABILITY: SOCIAL INSECURITY: HAS ANYONE EVER THREATENED TO HURT YOUR FAMILY OR YOUR PETS?: NO

## 2024-01-17 SDOH — SOCIAL STABILITY: SOCIAL INSECURITY: HAVE YOU HAD THOUGHTS OF HARMING ANYONE ELSE?: NO

## 2024-01-17 SDOH — SOCIAL STABILITY: SOCIAL INSECURITY: ARE THERE ANY APPARENT SIGNS OF INJURIES/BEHAVIORS THAT COULD BE RELATED TO ABUSE/NEGLECT?: NO

## 2024-01-17 ASSESSMENT — LIFESTYLE VARIABLES
HOW OFTEN DO YOU HAVE A DRINK CONTAINING ALCOHOL: NEVER
SKIP TO QUESTIONS 9-10: 1
AUDIT-C TOTAL SCORE: 0
HOW MANY STANDARD DRINKS CONTAINING ALCOHOL DO YOU HAVE ON A TYPICAL DAY: PATIENT DOES NOT DRINK
HOW OFTEN DO YOU HAVE 6 OR MORE DRINKS ON ONE OCCASION: NEVER
AUDIT-C TOTAL SCORE: 0

## 2024-01-17 ASSESSMENT — ACTIVITIES OF DAILY LIVING (ADL)
JUDGMENT_ADEQUATE_SAFELY_COMPLETE_DAILY_ACTIVITIES: YES
HEARING - RIGHT EAR: DIFFICULTY WITH NOISE
PATIENT'S MEMORY ADEQUATE TO SAFELY COMPLETE DAILY ACTIVITIES?: YES
LACK_OF_TRANSPORTATION: NO
FEEDING YOURSELF: INDEPENDENT
DRESSING YOURSELF: INDEPENDENT
BATHING: INDEPENDENT
ADEQUATE_TO_COMPLETE_ADL: YES
TOILETING: INDEPENDENT
HEARING - LEFT EAR: DIFFICULTY WITH NOISE
ASSISTIVE_DEVICE: WALKER
WALKS IN HOME: INDEPENDENT
GROOMING: INDEPENDENT

## 2024-01-17 ASSESSMENT — PATIENT HEALTH QUESTIONNAIRE - PHQ9
2. FEELING DOWN, DEPRESSED OR HOPELESS: NOT AT ALL
1. LITTLE INTEREST OR PLEASURE IN DOING THINGS: NOT AT ALL
SUM OF ALL RESPONSES TO PHQ9 QUESTIONS 1 & 2: 0

## 2024-01-17 ASSESSMENT — COGNITIVE AND FUNCTIONAL STATUS - GENERAL
PATIENT BASELINE BEDBOUND: NO
TURNING FROM BACK TO SIDE WHILE IN FLAT BAD: A LITTLE
STANDING UP FROM CHAIR USING ARMS: A LITTLE
DAILY ACTIVITIY SCORE: 24
MOVING TO AND FROM BED TO CHAIR: A LITTLE
CLIMB 3 TO 5 STEPS WITH RAILING: A LITTLE
DAILY ACTIVITIY SCORE: 24
WALKING IN HOSPITAL ROOM: A LITTLE
MOBILITY SCORE: 19
CLIMB 3 TO 5 STEPS WITH RAILING: A LITTLE
DAILY ACTIVITIY SCORE: 24
STANDING UP FROM CHAIR USING ARMS: A LITTLE
MOBILITY SCORE: 19
CLIMB 3 TO 5 STEPS WITH RAILING: A LITTLE
MOBILITY SCORE: 23
WALKING IN HOSPITAL ROOM: A LITTLE
MOVING TO AND FROM BED TO CHAIR: A LITTLE
TURNING FROM BACK TO SIDE WHILE IN FLAT BAD: A LITTLE

## 2024-01-17 ASSESSMENT — PAIN DESCRIPTION - LOCATION
LOCATION: ABDOMEN
LOCATION: HEAD

## 2024-01-17 ASSESSMENT — PAIN SCALES - GENERAL
PAINLEVEL_OUTOF10: 9
PAINLEVEL_OUTOF10: 9
PAINLEVEL_OUTOF10: 5 - MODERATE PAIN
PAINLEVEL_OUTOF10: 8
PAINLEVEL_OUTOF10: 7
PAINLEVEL_OUTOF10: 5 - MODERATE PAIN
PAINLEVEL_OUTOF10: 8

## 2024-01-17 ASSESSMENT — PAIN - FUNCTIONAL ASSESSMENT
PAIN_FUNCTIONAL_ASSESSMENT: 0-10

## 2024-01-17 NOTE — H&P
History Of Present Illness  Jada Marlow is a 73 y.o. female with past medical history significant for DJD, BLADE, peptic ulcer disease and hypothyroidism presents today with complaints of generalized weakness.  Has a history of hiatal hernia with schedule to follow-up with Dr. Altman.  States she has been having sternal pain for the past couple weeks.  States that while she was in the process of trying to find his office, she collapsed.  States she was walking all around trying to find his office and became so weak that this caused her knees to feel weak and for her to collapse.  Admits to loss of appetite, states she has not been able to eat or drink properly for the last 3 weeks due to this.  Admits to weight loss secondary to loss of appetite.  Anytime she drinks anything, she feels nauseous.  States she has had issues with acid reflux.  Patient lives at an assisted living facility.  Uses a rollator for assistance with ambulation.  Does not use oxygen.  Admits to occasional headaches.  Denies dizziness, lightheadedness, shortness of breath, abdominal pain or nausea, or fevers or chills.  Denies any pain or burning with urination.    ER course: On arrival, patient was afebrile and hemodynamically stable with an SpO2 96%.  Glucose 92.  Chloride 108.  BNP 88, troponin at 1655 was 7.  WBC 5.4.  Urinalysis shows yellow and clear color with trace leukocyte esterase.  CT abdomen pelvis results noted below.  CT chest results noted below.  Patient given IV Pepcid, IV morphine, and normal saline in the ED.    Admitting provider is Dr. Mayfield      Past Medical History  As noted above in HPI    Surgical History  History reviewed. No pertinent surgical history.     Social History  Denies alcohol or drug use.  Admits to smoking 2-3 cigarettes a day.    Family History  No family history on file.     Allergies  Aspirin and Vancomycin    Review of Systems  10 point review of system negative except as noted above in  HPI    Physical Exam  Constitutional:       Appearance: Normal appearance.   HENT:      Head: Normocephalic and atraumatic.      Mouth/Throat:      Mouth: Mucous membranes are moist.      Pharynx: Oropharynx is clear.   Eyes:      Extraocular Movements: Extraocular movements intact.      Conjunctiva/sclera: Conjunctivae normal.      Pupils: Pupils are equal, round, and reactive to light.   Cardiovascular:      Rate and Rhythm: Normal rate and regular rhythm.      Pulses: Normal pulses.      Heart sounds: Normal heart sounds.   Pulmonary:      Effort: Pulmonary effort is normal.      Breath sounds: Normal breath sounds. No wheezing.   Abdominal:      General: Bowel sounds are normal.      Palpations: Abdomen is soft.      Tenderness: There is no abdominal tenderness.   Musculoskeletal:         General: No swelling. Normal range of motion.   Skin:     General: Skin is warm and dry.   Neurological:      General: No focal deficit present.      Mental Status: She is oriented to person, place, and time.   Psychiatric:         Mood and Affect: Mood normal.         Behavior: Behavior normal.       Last Recorded Vitals  Blood pressure 152/67, pulse 91, temperature 37.3 °C (99.1 °F), resp. rate 17, height 1.524 m (5'), weight 45.4 kg (100 lb), SpO2 96 %.    Relevant Results  Results for orders placed or performed during the hospital encounter of 01/16/24 (from the past 24 hour(s))   CBC and Auto Differential   Result Value Ref Range    WBC 5.4 4.4 - 11.3 x10*3/uL    nRBC 0.0 0.0 - 0.0 /100 WBCs    RBC 3.92 (L) 4.00 - 5.20 x10*6/uL    Hemoglobin 12.3 12.0 - 16.0 g/dL    Hematocrit 37.1 36.0 - 46.0 %    MCV 95 80 - 100 fL    MCH 31.4 26.0 - 34.0 pg    MCHC 33.2 32.0 - 36.0 g/dL    RDW 12.9 11.5 - 14.5 %    Platelets 216 150 - 450 x10*3/uL    Neutrophils % 60.0 40.0 - 80.0 %    Immature Granulocytes %, Automated 0.2 0.0 - 0.9 %    Lymphocytes % 31.1 13.0 - 44.0 %    Monocytes % 7.9 2.0 - 10.0 %    Eosinophils % 0.4 0.0 - 6.0 %     Basophils % 0.4 0.0 - 2.0 %    Neutrophils Absolute 3.25 1.60 - 5.50 x10*3/uL    Immature Granulocytes Absolute, Automated 0.01 0.00 - 0.50 x10*3/uL    Lymphocytes Absolute 1.68 0.80 - 3.00 x10*3/uL    Monocytes Absolute 0.43 0.05 - 0.80 x10*3/uL    Eosinophils Absolute 0.02 0.00 - 0.40 x10*3/uL    Basophils Absolute 0.02 0.00 - 0.10 x10*3/uL   Comprehensive metabolic panel   Result Value Ref Range    Glucose 92 74 - 99 mg/dL    Sodium 137 136 - 145 mmol/L    Potassium 3.9 3.5 - 5.3 mmol/L    Chloride 108 (H) 98 - 107 mmol/L    Bicarbonate 24 21 - 32 mmol/L    Anion Gap 9 (L) 10 - 20 mmol/L    Urea Nitrogen 10 6 - 23 mg/dL    Creatinine 0.65 0.50 - 1.05 mg/dL    eGFR >90 >60 mL/min/1.73m*2    Calcium 8.7 8.6 - 10.3 mg/dL    Albumin 3.3 (L) 3.4 - 5.0 g/dL    Alkaline Phosphatase 91 33 - 136 U/L    Total Protein 5.8 (L) 6.4 - 8.2 g/dL    AST 26 9 - 39 U/L    Bilirubin, Total 0.8 0.0 - 1.2 mg/dL    ALT 17 7 - 45 U/L   Protime-INR   Result Value Ref Range    Protime 12.5 9.8 - 12.8 seconds    INR 1.1 0.9 - 1.1   aPTT   Result Value Ref Range    aPTT 38 27 - 38 seconds   Troponin I, High Sensitivity   Result Value Ref Range    Troponin I, High Sensitivity 7 0 - 13 ng/L   B-Type Natriuretic Peptide   Result Value Ref Range    BNP 88 0 - 99 pg/mL   Urinalysis with Reflex Culture and Microscopic   Result Value Ref Range    Color, Urine Yellow Straw, Yellow    Appearance, Urine Clear Clear    Specific Gravity, Urine 1.017 1.005 - 1.035    pH, Urine 5.0 5.0, 5.5, 6.0, 6.5, 7.0, 7.5, 8.0    Protein, Urine NEGATIVE NEGATIVE mg/dL    Glucose, Urine NEGATIVE NEGATIVE mg/dL    Blood, Urine NEGATIVE NEGATIVE    Ketones, Urine NEGATIVE NEGATIVE mg/dL    Bilirubin, Urine NEGATIVE NEGATIVE    Urobilinogen, Urine <2.0 <2.0 mg/dL    Nitrite, Urine NEGATIVE NEGATIVE    Leukocyte Esterase, Urine TRACE (A) NEGATIVE   Microscopic Only, Urine   Result Value Ref Range    WBC, Urine 1-5 1-5, NONE /HPF    RBC, Urine 1-2 NONE, 1-2, 3-5 /HPF     Mucus, Urine 1+ Reference range not established. /LPF    Hyaline Casts, Urine 1+ (A) NONE /LPF      CT angio chest for pulmonary embolism    Result Date: 1/16/2024  Interpreted By:  Marlo Wright, STUDY: CT ANGIO CHEST FOR PULMONARY EMBOLISM;  1/16/2024 6:32 pm   INDICATION: Signs/Symptoms:Episode of collapsing.   COMPARISON: None.   ACCESSION NUMBER(S): [id number]   ORDERING CLINICIAN: ROSELINE FLORES   TECHNIQUE: Helical data acquisition of the chest was obtained with IV contrast material. 75 mL of Omnipaque 350. MIP reconstructions. Images were reformatted in axial, coronal, and sagittal planes.   FINDINGS: Lungs and Pleura: Mild biapical centrilobular pulmonary emphysematous changes. Streaky bibasilar atelectasis. Few small 2-3 mm biapical pulmonary nodules. No pulmonary consolidation. No pleural effusion. No pneumothorax.   Mediastinum and axilla: No adenopathy by CT size criteria. No cardiomegaly or pericardial effusion. LAD calcifications versus stent. No thoracic aortic aneurysm. Small hiatal hernia.   Visualized Upper Abdomen: Refer to dedicated CT abdomen performed at same time for details.   MSK/Chest Wall: No aggressive bony lesion identified. Scattered marginal osteophytes in the spine.       No evidence of pulmonary embolism.   Scattered atelectasis.   Small pulmonary nodules measuring up to 3 mm. No further follow-up is required, however, if the patient has high risk factors for primary lung malignancy, follow-up noncontrast CT scan chest in 12 months may be obtained. (Jose Diazhopatricia et al., Guidelines for management of incidental pulmonary nodules detected on CT images: From the Fleischner Society 2017, Radiology. 2017 Jul;284 (1):228-243.)   Signed by: Marlo Wright 1/16/2024 7:01 PM Dictation workstation:   [id number]    CT abdomen pelvis w IV contrast    Result Date: 1/16/2024  Interpreted By:  Marlo Wright, STUDY: CT ABDOMEN PELVIS W IV CONTRAST;  1/16/2024 6:32 pm   INDICATION: 73  y/o   F with  Signs/Symptoms:Inability to keep food down, episode of collapsing.   LIMITATIONS: None.   ACCESSION NUMBER(S): [id number]   ORDERING CLINICIAN: ROSELINE FLORES   TECHNIQUE: After the administration of IV iodonated contrast, spiral axial images were obtained from the xiphoid down through the symphysis pubis. Sagittal and coronal reconstruction images were generated. 75 mL of Omnipaque 350.   COMPARISON: None.   FINDINGS: Lower Chest: Refer to dedicated CT chest for details.   Liver: The liver is unremarkable without focal lesion.   Gallbladder and Biliary: Small amount of gallbladder sludge in the neck.   Pancreas: Atrophic pancreas.   Spleen: No abnormality identified in the spleen.   Adrenals: No abnormality identified in either adrenal gland.   Urinary: Few small subcentimeter bilateral renal hypodensities which are too small to characterize. No hydronephrosis.   Gastrointestinal/Peritoneum: No small or large bowel obstruction in the visualized abdomen. In the abdomen, there is no extraluminal air. No significant free fluid. Severe sigmoid colonic diverticulosis. Gastric wall thickening. Stranding along the anterior aspect of the body.   Vascular: Abdominal aorta is normal in caliber.Atherosclerosis.   Lymphatics: No enlarged lymph nodes by size criteria.   MSK/Body Wall: No aggressive bony lesion identified.       Gastric wall thickening suspicious for gastritis given the stranding along the anterior aspect of the body. This may be further assessed with endoscopy as warranted.   Colonic diverticulosis.   Small amount of gallbladder sludge.   Signed by: Marlo Wright 1/16/2024 6:59 PM Dictation workstation:   [id number]     Assessment/Plan   Principal Problem:    General weakness    Jada Marlow is a 73 y.o. female presents today with complaints of generalized weakness.  Has a history of hiatal hernia with schedule to follow-up with Dr. Altman.  States she has been having sternal pain for the  past couple weeks.  Admits to a fall today after walking for long period of time.  Admits to acid reflux, loss of appetite, and weight loss.    #Generalized weakness  #Fall  #Gastritis  GI consult  Admit for observation and telemetry monitoring  IV fluids  PT/OT for evaluation and treatment  IV Protonix daily  Tylenol as needed for mild pain, morphine as needed for moderate pain  Zofran as needed for nausea or vomiting  Start on clear liquid diet, advance as tolerated  CBC, BMP in a.m.  Q 4 vitals    #Mild UTI (urinary tract infection)  Rocephin daily  Urine culture pending    #Nicotine use disorder, daily cigarette use  Nicotine patch ordered    #Incidental small pulmonary nodules noticed on CT  Discussed with patient  Follow-up as warranted    Continue at home medications as appropriate when med rec is completed    Chronic conditions:  #PUD  #Hypothyroidism    #DVT prophylaxis  Lovenox daily  Ambulation       I spent 30 minutes in the professional and overall care of this patient.    Roel Washington PA-C

## 2024-01-17 NOTE — CARE PLAN
The patient's goals for the shift include      The clinical goals for the shift include comfort    Over the shift, the patient did not make progress toward the following goals. Barriers to progression include acute illness. Recommendations to address these barriers include communication.

## 2024-01-17 NOTE — H&P (VIEW-ONLY)
Reason For Consult  Hiatal hernia, sternal pain, loss of appetite    History Of Present Illness  Jada Marlow is a 73 y.o. female with past medical history significant for DJD, BLADE, peptic ulcer disease and hypothyroidism presents today with complaints of generalized weakness.  Has a reported history of hiatal hernia with schedule to follow-up with Dr. Altman.  States she has been having sternal pain for the past couple weeks.  States that while she was in the process of trying to find his office, she collapsed.  States she was walking all around trying to find his office and became so weak that this caused her knees to feel weak and for her to collapse.  Admits to loss of appetite, states she has not been able to eat or drink properly for the last 3 weeks due to this.  Admits to weight loss secondary to loss of appetite.  Anytime she drinks anything, she feels nauseous.  States she has had issues with acid reflux.  Admits to occasional headaches.  Denies dizziness, lightheadedness, shortness of breath, abdominal pain or nausea, or fevers or chills.  Denies any pain or burning with urination.    GI consult for hiatal hernia, sternal pain, loss of appetite  Patient was seen and examined at the bedside in patient's room.  She appeared to rest comfortably in bed in no acute distress.  She denies any abdominal discomfort but admits to pain behind her sternum for approximately last 3 weeks and only able to consume liquid diet.  Reports periodic dysphagia when it is increasingly difficult for her to swallow but no previous history of food impaction removal in ER as per her.  Admits to also periodic nausea and vomiting every other day and lately every day.  Reports seeing streaks of bright red blood on the stool on and off for last couple of years.  Denies any NSAIDs use  No previous history of EGD  Previous colonoscopy approximately 13 years ago at the [facility] as per patient.  Personal history of colonic  polyps  Laboratory data today remarkable for hemoglobin 11.5, WBC is 4.3, LFTs unremarkable, .    Abdomen/pelvic CT with IV contrast from yesterday, January 16th, 2024 showed unremarkable liver without focal lesion, small amount of gallbladder sludge in the neck of the gallbladder, atrophic pancreas, no bowel obstruction in the visualized abdomen, severe sigmoid diverticulosis, gastric wall thickening stranding along the posterior aspect of the body, please see full official report    Past Medical History  She has no past medical history on file.    Surgical History  She has no past surgical history on file.     Social History  She reports that she has been smoking cigarettes. She has never used smokeless tobacco. She reports that she does not currently use alcohol. She reports that she does not use drugs.    Family History  No family history on file.     Allergies  Aspirin and Vancomycin    Review of Systems    A 10 point review of system is negative except for what is mentioned in the HPI     Physical Exam     The note was created using voice recognition transcription software. Despite proofreading, unintentional typographical errors may be present. Please contact the GI office with any questions or concerns.     Current Medications: reviewed    Vital Signs: Reviewed    Physical Exam:  General: no apparent distress, pleasant and cooperative  Skin:  Warm and dry, no jaundice  HEENT: No scleral icterus, no conjunctival pallor, normocephalic, atraumatic, mucous membranes moist  Neck:  atraumatic, trachea midline, no JVD  Chest:  decreased air entry to auscultation bilaterally. No wheezes, rales, or rhonchi  CV:  Regular rate and rhythm.  Positive S1/S2  Abdomen: no distension, +BS, soft, tender to palpation, in epigastrium, no rebound tenderness, no guarding, no rigidity, no discernible ascites   Extremities: no lower extremity edema, Chronic pigmentary changes, no cyanosis  Neurological:  A&Ox3 , no  asterixis  Psychiatric: cooperative     Investigations:  Labs, radiological imaging and cardiac work up were reviewed    Last Recorded Vitals  Blood pressure 119/59, pulse 71, temperature 36.2 °C (97.2 °F), temperature source Temporal, resp. rate 20, height 1.524 m (5'), weight 62.1 kg (136 lb 12.8 oz), SpO2 94 %.    Relevant Results       Scheduled medications  atorvastatin, 40 mg, oral, Daily  enoxaparin, 40 mg, subcutaneous, q24h  FLUoxetine, 20 mg, oral, Daily  levothyroxine, 150 mcg, oral, Daily  loratadine, 10 mg, oral, Daily  nicotine, 1 patch, transdermal, Daily  [START ON 1/18/2024] pantoprazole, 40 mg, oral, Daily before breakfast  QUEtiapine, 25 mg, oral, 4x daily  tiZANidine, 4 mg, oral, q8h  traZODone, 150 mg, oral, Nightly      Continuous medications  sodium chloride 0.9%, 125 mL/hr, Last Rate: 125 mL/hr (01/17/24 1259)      PRN medications  PRN medications: acetaminophen **OR** acetaminophen **OR** acetaminophen, albuterol, guaiFENesin, loperamide, morphine, ondansetron **OR** ondansetron, polyethylene glycol, sodium chloride    Results for orders placed or performed during the hospital encounter of 01/16/24 (from the past 24 hour(s))   Urinalysis with Reflex Culture and Microscopic   Result Value Ref Range    Color, Urine Yellow Straw, Yellow    Appearance, Urine Clear Clear    Specific Gravity, Urine 1.017 1.005 - 1.035    pH, Urine 5.0 5.0, 5.5, 6.0, 6.5, 7.0, 7.5, 8.0    Protein, Urine NEGATIVE NEGATIVE mg/dL    Glucose, Urine NEGATIVE NEGATIVE mg/dL    Blood, Urine NEGATIVE NEGATIVE    Ketones, Urine NEGATIVE NEGATIVE mg/dL    Bilirubin, Urine NEGATIVE NEGATIVE    Urobilinogen, Urine <2.0 <2.0 mg/dL    Nitrite, Urine NEGATIVE NEGATIVE    Leukocyte Esterase, Urine TRACE (A) NEGATIVE   Extra Urine Gray Tube   Result Value Ref Range    Extra Tube Hold for add-ons.    Microscopic Only, Urine   Result Value Ref Range    WBC, Urine 1-5 1-5, NONE /HPF    RBC, Urine 1-2 NONE, 1-2, 3-5 /HPF    Mucus,  Urine 1+ Reference range not established. /LPF    Hyaline Casts, Urine 1+ (A) NONE /LPF   Troponin I, High Sensitivity   Result Value Ref Range    Troponin I, High Sensitivity 10 0 - 13 ng/L   CBC   Result Value Ref Range    WBC 4.3 (L) 4.4 - 11.3 x10*3/uL    nRBC 0.0 0.0 - 0.0 /100 WBCs    RBC 3.69 (L) 4.00 - 5.20 x10*6/uL    Hemoglobin 11.5 (L) 12.0 - 16.0 g/dL    Hematocrit 35.3 (L) 36.0 - 46.0 %    MCV 96 80 - 100 fL    MCH 31.2 26.0 - 34.0 pg    MCHC 32.6 32.0 - 36.0 g/dL    RDW 12.8 11.5 - 14.5 %    Platelets 187 150 - 450 x10*3/uL   Basic metabolic panel   Result Value Ref Range    Glucose 89 74 - 99 mg/dL    Sodium 140 136 - 145 mmol/L    Potassium 4.0 3.5 - 5.3 mmol/L    Chloride 111 (H) 98 - 107 mmol/L    Bicarbonate 24 21 - 32 mmol/L    Anion Gap 9 (L) 10 - 20 mmol/L    Urea Nitrogen 6 6 - 23 mg/dL    Creatinine 0.53 0.50 - 1.05 mg/dL    eGFR >90 >60 mL/min/1.73m*2    Calcium 7.6 (L) 8.6 - 10.3 mg/dL         Assessment/Plan     Jada Marlow is a 73 y.o. female with past medical history significant for DJD, BLADE, peptic ulcer disease and hypothyroidism presents today with complaints of generalized weakness.  Has a reported history of hiatal hernia. States she has been having sternal pain for the past couple weeks. Admits to loss of appetite, states she has not been able to eat or drink properly for the last 3 weeks due to this.  Admits to weight loss secondary to loss of appetite.  Anytime she drinks anything, she feels nauseous.  States she has had issues with acid reflux.    GI consult for hiatal hernia, sternal pain, loss of appetite  Given patient's epigastric discomfort with inability to hold solid food down and new history of dysphagia for last 3 weeks will proceed with planning EGD for tomorrow.  Protonix 40 mg p.o. twice daily  Okay clear liquid for today  N.p.o. after midnight  Gastroscopy for epigastric discomfort, dysphagia and reported 10 pounds body weight loss in last month.  Patient is  recommended to undergo colonoscopy within 1 to 2 months after discharge as previous one was conducted over 10 years ago, patient reports personal history of colonic polyps  Patient discussed with Dr. oDntrell SOLIS    I spent 60 minutes in the professional and overall care of this patient.      Melody Cabrera, APRN-CNP

## 2024-01-17 NOTE — PROGRESS NOTES
Physical Therapy    Physical Therapy Evaluation    Patient Name: Jada Marlow  MRN: [id number]  Today's Date: 1/17/2024   Time Calculation  Start Time: 0936  Stop Time: 0948  Time Calculation (min): 12 min    Assessment/Plan   PT Assessment  PT Assessment Results: Decreased strength, Decreased endurance, Impaired balance, Decreased mobility, Impaired hearing  Rehab Prognosis: Good  Evaluation/Treatment Tolerance: Patient limited by fatigue  Assessment Comment: Continued skilled PT intervention indicated to facilitate increased strength, balance & gait stability  End of Session Patient Position: Bed, 2 rail up (hob elevated to comfort, call light in reach, all needs met)  IP OR SWING BED PT PLAN  Inpatient or Swing Bed: Inpatient  PT Plan  Treatment/Interventions: Bed mobility, Transfer training, Gait training, Balance training, Endurance training, Therapeutic exercise, Therapeutic activity  PT Plan: Skilled PT  PT Frequency: 2 times per week  PT Discharge Recommendations: No PT needed after discharge (Will follow during acute los, no skilled needs anticipated for dc home to [place] as medical condition improves & mobility subsequently stabilizes)  PT - OK to Discharge: Yes (to next level of care when cleared by medical team)    Subjective     Current Problem:  Patient Active Problem List   Diagnosis    Hiatal hernia with gastroesophageal reflux    General weakness       General Visit Information:  General  Reason for Referral: PT eval & treat/impaired mobility DX: laisha, generalized weakness, collapse (1/16/24 weakness, dizziness, collapse; ctap: pulmonary nodules, PE (-))  Referred By: Felix  Past Medical History Relevant to Rehab: hiatal hernia, djd, alla, peptic ulcer disease, hypothyroidism, +nicotine use  Co-Treatment: OT  Co-Treatment Reason: to maximize pt safety& mobility  General Comment: Pleasant & cooperative, receptive to mobility& instructions, a&ox4, reports dizziness attritbutes to limited/clear liquid  diet    Home Living:  Home Living  Home Living Comments: [facility]; stall shower w/ chair,grabbar, hand held shower hose; raised toilet w/ grab bar; standard bed    Prior Level of Function:  Prior Function Per Pt/Caregiver Report  Prior Function Comments: independent mobility w/ use of rollator able to walk to/from dining room at [facility], denies h/o falls; ind adl; iadl's managed by [facility], transportation through [facility] or provide a ride    Precautions:  Precautions  Precautions Comment: fall, alarm, clear liquid diet, [place], IV, corrective lenses full time    Vital Signs:     Objective     Pain:  Pain Assessment  Pain Assessment:  (sternal pain 6/10, premedicated)  General Assessments:    Sensation  Sensation Comment: denies numbness/tingling      Functional Assessments:     Bed Mobility  Bed Mobility:  (supervised supine<>sit report of dizziness upon sitting)  Transfers  Transfer:  (donned shoes prior to standing, supervision sit<>stand bed<>ww, dizziness upon standing)  Ambulation/Gait Training  Ambulation/Gait Training Performed:  (sba w/ ww 80ft performed changes in direction & maneuver of environmental obstacles, slightly slow/guarded gait w/ report of mild fatigue but no acute loss of balance)   Extremity/Trunk Assessments:   RLE   RLE :  (arom wfl, strength grossly 4/5)  LLE   LLE :  (arom wfl, strength grossly 4/5)    Outcome Measures:  [facility] Basic Mobility  Turning from your back to your side while in a flat bed without using bedrails: None  Moving from lying on your back to sitting on the side of a flat bed without using bedrails: A little  Moving to and from bed to chair (including a wheelchair): A little  Standing up from a chair using your arms (e.g. wheelchair or bedside chair): A little  To walk in hospital room: A little  Climbing 3-5 steps with railing: A little  Basic Mobility - Total Score: 19   Goals:  Encounter Problems       Encounter Problems (Active)       PT Problem       bed mobility   (Progressing)        Start:  01/17/24    Expected End:  01/31/24       Independent supine<>sit         transfers   (Progressing)       Start:  01/17/24    Expected End:  01/31/24       Modified independent sit<>stand w/ use of ww         gait  (Progressing)       Start:  01/17/24    Expected End:  01/31/24       Modified independent >=100ft w/ use of ww           balance   (Progressing)       Start:  01/17/24    Expected End:  01/31/24       Self maintain standing supported balance while performing >=10reps ble therex to facilitate increased recovery w/ challenged fxl mobility             Pain - Adult            Education Documentation  Mobility Training, taught by Jennifer Serra PT at 1/17/2024 11:22 AM.  Learner: Patient  Readiness: Acceptance  Method: Explanation  Response: Verbalizes Understanding, Needs Reinforcement  Comment: safety, self monitoring physical signs of exertion w/ fxl acts for safety& tolerance

## 2024-01-17 NOTE — CONSULTS
Reason For Consult  Hiatal hernia, sternal pain, loss of appetite    History Of Present Illness  Jada Marlow is a 73 y.o. female with past medical history significant for DJD, BLADE, peptic ulcer disease and hypothyroidism presents today with complaints of generalized weakness.  Has a reported history of hiatal hernia with schedule to follow-up with Dr. Altman.  States she has been having sternal pain for the past couple weeks.  States that while she was in the process of trying to find his office, she collapsed.  States she was walking all around trying to find his office and became so weak that this caused her knees to feel weak and for her to collapse.  Admits to loss of appetite, states she has not been able to eat or drink properly for the last 3 weeks due to this.  Admits to weight loss secondary to loss of appetite.  Anytime she drinks anything, she feels nauseous.  States she has had issues with acid reflux.  Admits to occasional headaches.  Denies dizziness, lightheadedness, shortness of breath, abdominal pain or nausea, or fevers or chills.  Denies any pain or burning with urination.    GI consult for hiatal hernia, sternal pain, loss of appetite  Patient was seen and examined at the bedside in patient's room.  She appeared to rest comfortably in bed in no acute distress.  She denies any abdominal discomfort but admits to pain behind her sternum for approximately last 3 weeks and only able to consume liquid diet.  Reports periodic dysphagia when it is increasingly difficult for her to swallow but no previous history of food impaction removal in ER as per her.  Admits to also periodic nausea and vomiting every other day and lately every day.  Reports seeing streaks of bright red blood on the stool on and off for last couple of years.  Denies any NSAIDs use  No previous history of EGD  Previous colonoscopy approximately 13 years ago at the [facility] as per patient.  Personal history of colonic  polyps  Laboratory data today remarkable for hemoglobin 11.5, WBC is 4.3, LFTs unremarkable, .    Abdomen/pelvic CT with IV contrast from yesterday, January 16th, 2024 showed unremarkable liver without focal lesion, small amount of gallbladder sludge in the neck of the gallbladder, atrophic pancreas, no bowel obstruction in the visualized abdomen, severe sigmoid diverticulosis, gastric wall thickening stranding along the posterior aspect of the body, please see full official report    Past Medical History  She has no past medical history on file.    Surgical History  She has no past surgical history on file.     Social History  She reports that she has been smoking cigarettes. She has never used smokeless tobacco. She reports that she does not currently use alcohol. She reports that she does not use drugs.    Family History  No family history on file.     Allergies  Aspirin and Vancomycin    Review of Systems    A 10 point review of system is negative except for what is mentioned in the HPI     Physical Exam     The note was created using voice recognition transcription software. Despite proofreading, unintentional typographical errors may be present. Please contact the GI office with any questions or concerns.     Current Medications: reviewed    Vital Signs: Reviewed    Physical Exam:  General: no apparent distress, pleasant and cooperative  Skin:  Warm and dry, no jaundice  HEENT: No scleral icterus, no conjunctival pallor, normocephalic, atraumatic, mucous membranes moist  Neck:  atraumatic, trachea midline, no JVD  Chest:  decreased air entry to auscultation bilaterally. No wheezes, rales, or rhonchi  CV:  Regular rate and rhythm.  Positive S1/S2  Abdomen: no distension, +BS, soft, tender to palpation, in epigastrium, no rebound tenderness, no guarding, no rigidity, no discernible ascites   Extremities: no lower extremity edema, Chronic pigmentary changes, no cyanosis  Neurological:  A&Ox3 , no  asterixis  Psychiatric: cooperative     Investigations:  Labs, radiological imaging and cardiac work up were reviewed    Last Recorded Vitals  Blood pressure 119/59, pulse 71, temperature 36.2 °C (97.2 °F), temperature source Temporal, resp. rate 20, height 1.524 m (5'), weight 62.1 kg (136 lb 12.8 oz), SpO2 94 %.    Relevant Results       Scheduled medications  atorvastatin, 40 mg, oral, Daily  enoxaparin, 40 mg, subcutaneous, q24h  FLUoxetine, 20 mg, oral, Daily  levothyroxine, 150 mcg, oral, Daily  loratadine, 10 mg, oral, Daily  nicotine, 1 patch, transdermal, Daily  [START ON 1/18/2024] pantoprazole, 40 mg, oral, Daily before breakfast  QUEtiapine, 25 mg, oral, 4x daily  tiZANidine, 4 mg, oral, q8h  traZODone, 150 mg, oral, Nightly      Continuous medications  sodium chloride 0.9%, 125 mL/hr, Last Rate: 125 mL/hr (01/17/24 1259)      PRN medications  PRN medications: acetaminophen **OR** acetaminophen **OR** acetaminophen, albuterol, guaiFENesin, loperamide, morphine, ondansetron **OR** ondansetron, polyethylene glycol, sodium chloride    Results for orders placed or performed during the hospital encounter of 01/16/24 (from the past 24 hour(s))   Urinalysis with Reflex Culture and Microscopic   Result Value Ref Range    Color, Urine Yellow Straw, Yellow    Appearance, Urine Clear Clear    Specific Gravity, Urine 1.017 1.005 - 1.035    pH, Urine 5.0 5.0, 5.5, 6.0, 6.5, 7.0, 7.5, 8.0    Protein, Urine NEGATIVE NEGATIVE mg/dL    Glucose, Urine NEGATIVE NEGATIVE mg/dL    Blood, Urine NEGATIVE NEGATIVE    Ketones, Urine NEGATIVE NEGATIVE mg/dL    Bilirubin, Urine NEGATIVE NEGATIVE    Urobilinogen, Urine <2.0 <2.0 mg/dL    Nitrite, Urine NEGATIVE NEGATIVE    Leukocyte Esterase, Urine TRACE (A) NEGATIVE   Extra Urine Gray Tube   Result Value Ref Range    Extra Tube Hold for add-ons.    Microscopic Only, Urine   Result Value Ref Range    WBC, Urine 1-5 1-5, NONE /HPF    RBC, Urine 1-2 NONE, 1-2, 3-5 /HPF    Mucus,  Urine 1+ Reference range not established. /LPF    Hyaline Casts, Urine 1+ (A) NONE /LPF   Troponin I, High Sensitivity   Result Value Ref Range    Troponin I, High Sensitivity 10 0 - 13 ng/L   CBC   Result Value Ref Range    WBC 4.3 (L) 4.4 - 11.3 x10*3/uL    nRBC 0.0 0.0 - 0.0 /100 WBCs    RBC 3.69 (L) 4.00 - 5.20 x10*6/uL    Hemoglobin 11.5 (L) 12.0 - 16.0 g/dL    Hematocrit 35.3 (L) 36.0 - 46.0 %    MCV 96 80 - 100 fL    MCH 31.2 26.0 - 34.0 pg    MCHC 32.6 32.0 - 36.0 g/dL    RDW 12.8 11.5 - 14.5 %    Platelets 187 150 - 450 x10*3/uL   Basic metabolic panel   Result Value Ref Range    Glucose 89 74 - 99 mg/dL    Sodium 140 136 - 145 mmol/L    Potassium 4.0 3.5 - 5.3 mmol/L    Chloride 111 (H) 98 - 107 mmol/L    Bicarbonate 24 21 - 32 mmol/L    Anion Gap 9 (L) 10 - 20 mmol/L    Urea Nitrogen 6 6 - 23 mg/dL    Creatinine 0.53 0.50 - 1.05 mg/dL    eGFR >90 >60 mL/min/1.73m*2    Calcium 7.6 (L) 8.6 - 10.3 mg/dL         Assessment/Plan     Jada Marlow is a 73 y.o. female with past medical history significant for DJD, BLADE, peptic ulcer disease and hypothyroidism presents today with complaints of generalized weakness.  Has a reported history of hiatal hernia. States she has been having sternal pain for the past couple weeks. Admits to loss of appetite, states she has not been able to eat or drink properly for the last 3 weeks due to this.  Admits to weight loss secondary to loss of appetite.  Anytime she drinks anything, she feels nauseous.  States she has had issues with acid reflux.    GI consult for hiatal hernia, sternal pain, loss of appetite  Given patient's epigastric discomfort with inability to hold solid food down and new history of dysphagia for last 3 weeks will proceed with planning EGD for tomorrow.  Protonix 40 mg p.o. twice daily  Okay clear liquid for today  N.p.o. after midnight  Gastroscopy for epigastric discomfort, dysphagia and reported 10 pounds body weight loss in last month.  Patient is  recommended to undergo colonoscopy within 1 to 2 months after discharge as previous one was conducted over 10 years ago, patient reports personal history of colonic polyps  Patient discussed with Dr. Dontrell SOLIS    I spent 60 minutes in the professional and overall care of this patient.      Melody Cabrera, APRN-CNP

## 2024-01-17 NOTE — PROGRESS NOTES
Jada Marlow is a 73 y.o. female on day 0 of admission presenting with General weakness.      Subjective      Patient was seen and examined.  She was admitted to the emergency room last night because of generalized weakness.  Patient is taking multiple antihypertensive, diuretics for the treatment of her heart failure.  She has been on IV fluid since admission.  Patient had a cardiac cath on May 8, 2023 that showed normal coronaries and normal LV function.  She has moderate aortic regurgitation.    Objective   Elderly female fully awake alert oriented x 3 no distress    Lungs bilateral clear auscultation    Heart regular S1-S2  Last Recorded Vitals  /62   Pulse 61   Temp 36.4 °C (97.5 °F) (Temporal)   Resp 20   Wt 62.1 kg (136 lb 12.8 oz)   SpO2 95%   Intake/Output last 3 Shifts:    Intake/Output Summary (Last 24 hours) at 1/17/2024 1827  Last data filed at 1/17/2024 1709  Gross per 24 hour   Intake 2490.83 ml   Output --   Net 2490.83 ml       Admission Weight  Weight: 45.4 kg (100 lb) (01/16/24 1627)    Daily Weight  01/17/24 : 62.1 kg (136 lb 12.8 oz)    Image Results  CT angio chest for pulmonary embolism  Narrative: Interpreted By:  Marlo Wright,   STUDY:  CT ANGIO CHEST FOR PULMONARY EMBOLISM;  1/16/2024 6:32 pm      INDICATION:  Signs/Symptoms:Episode of collapsing.      COMPARISON:  None.      ACCESSION NUMBER(S):  [id number]      ORDERING CLINICIAN:  ROSELINE FLORES      TECHNIQUE:  Helical data acquisition of the chest was obtained with IV contrast  material. 75 mL of Omnipaque 350. MIP reconstructions. Images were  reformatted in axial, coronal, and sagittal planes.      FINDINGS:  Lungs and Pleura: Mild biapical centrilobular pulmonary emphysematous  changes. Streaky bibasilar atelectasis. Few small 2-3 mm biapical  pulmonary nodules. No pulmonary consolidation. No pleural effusion.  No pneumothorax.      Mediastinum and axilla: No adenopathy by CT size criteria. No  cardiomegaly or  pericardial effusion. LAD calcifications versus  stent. No thoracic aortic aneurysm. Small hiatal hernia.      Visualized Upper Abdomen: Refer to dedicated CT abdomen performed at  same time for details.      MSK/Chest Wall: No aggressive bony lesion identified. Scattered  marginal osteophytes in the spine.      Impression: No evidence of pulmonary embolism.      Scattered atelectasis.      Small pulmonary nodules measuring up to 3 mm. No further follow-up is  required, however, if the patient has high risk factors for primary  lung malignancy, follow-up noncontrast CT scan chest in 12 months may  be obtained. (Jose Rubalcava et al., Guidelines for management of  incidental pulmonary nodules detected on CT images: From the  Fleischner Society 2017, Radiology. 2017 Jul;284 (1):228-243.)      Signed by: Marlo Wright 1/16/2024 7:01 PM  Dictation workstation:   [id number]  CT abdomen pelvis w IV contrast  Narrative: Interpreted By:  Marlo Wright,   STUDY:  CT ABDOMEN PELVIS W IV CONTRAST;  1/16/2024 6:32 pm      INDICATION:  72 y/o   F with  Signs/Symptoms:Inability to keep food down, episode  of collapsing.      LIMITATIONS:  None.      ACCESSION NUMBER(S):  [id number]      ORDERING CLINICIAN:  ROSELINE FLORES      TECHNIQUE:  After the administration of IV iodonated contrast, spiral axial  images were obtained from the xiphoid down through the symphysis  pubis. Sagittal and coronal reconstruction images were generated.  75 mL of Omnipaque 350.      COMPARISON:  None.      FINDINGS:  Lower Chest: Refer to dedicated CT chest for details.      Liver: The liver is unremarkable without focal lesion.      Gallbladder and Biliary: Small amount of gallbladder sludge in the  neck.      Pancreas: Atrophic pancreas.      Spleen: No abnormality identified in the spleen.      Adrenals: No abnormality identified in either adrenal gland.      Urinary: Few small subcentimeter bilateral renal hypodensities which  are too small  to characterize. No hydronephrosis.      Gastrointestinal/Peritoneum: No small or large bowel obstruction in  the visualized abdomen. In the abdomen, there is no extraluminal air.  No significant free fluid. Severe sigmoid colonic diverticulosis.  Gastric wall thickening. Stranding along the anterior aspect of the  body.      Vascular: Abdominal aorta is normal in caliber.Atherosclerosis.      Lymphatics: No enlarged lymph nodes by size criteria.      MSK/Body Wall: No aggressive bony lesion identified.      Impression: Gastric wall thickening suspicious for gastritis given the stranding  along the anterior aspect of the body. This may be further assessed  with endoscopy as warranted.      Colonic diverticulosis.      Small amount of gallbladder sludge.      Signed by: Marlo Wright 1/16/2024 6:59 PM  Dictation workstation:   [id number]      Physical Exam    Relevant Results               Assessment/Plan      #1 SAMANTHA due to overdiuresis, continue with IV fluid and currently blood pressure medications and diuretics are on hold, check labs in a.m., PT OT to evaluate the patient.  Hopefully patient can be discharged home tomorrow.            Principal Problem:    General weakness  Active Problems:    Generalized weakness                  Brendan Mayfield MD

## 2024-01-17 NOTE — CARE PLAN
The patient's goals for the shift include      The clinical goals for the shift include comfort    Problem: Nutrition  Goal: Less than 5 days NPO/clear liquids  Outcome: Progressing  Goal: Oral intake greater than 50%  Outcome: Progressing  Goal: Oral intake greater 75%  Outcome: Progressing  Goal: Consume prescribed supplement  Outcome: Progressing  Goal: Adequate PO fluid intake  Outcome: Progressing  Goal: Nutrition support goals are met within 48 hrs  Outcome: Progressing  Goal: Nutrition support is meeting 75% of nutrient needs  Outcome: Progressing  Goal: Tube feed tolerance  Outcome: Progressing  Goal: BG  mg/dL  Outcome: Progressing  Goal: Lab values WNL  Outcome: Progressing  Goal: Electrolytes WNL  Outcome: Progressing  Goal: Promote healing  Outcome: Progressing  Goal: Maintain stable weight  Outcome: Progressing  Goal: Reduce weight from edema/fluid  Outcome: Progressing  Goal: Gradual weight gain  Outcome: Progressing  Goal: Improve ostomy output  Outcome: Progressing     Problem: Pain - Adult  Goal: Verbalizes/displays adequate comfort level or baseline comfort level  Outcome: Progressing     Problem: Safety - Adult  Goal: Free from fall injury  Outcome: Progressing     Problem: Discharge Planning  Goal: Discharge to home or other facility with appropriate resources  Outcome: Progressing     Problem: Chronic Conditions and Co-morbidities  Goal: Patient's chronic conditions and co-morbidity symptoms are monitored and maintained or improved  Outcome: Progressing     Problem: Fall/Injury  Goal: Not fall by end of shift  Outcome: Progressing  Goal: Be free from injury by end of the shift  Outcome: Progressing  Goal: Verbalize understanding of personal risk factors for fall in the hospital  Outcome: Progressing  Goal: Verbalize understanding of risk factor reduction measures to prevent injury from fall in the home  Outcome: Progressing  Goal: Use assistive devices by end of the shift  Outcome:  Progressing  Goal: Pace activities to prevent fatigue by end of the shift  Outcome: Progressing     Problem: Pain  Goal: Takes deep breaths with improved pain control throughout the shift  Outcome: Progressing  Goal: Turns in bed with improved pain control throughout the shift  Outcome: Progressing  Goal: Walks with improved pain control throughout the shift  Outcome: Progressing  Goal: Performs ADL's with improved pain control throughout shift  Outcome: Progressing  Goal: Participates in PT with improved pain control throughout the shift  Outcome: Progressing  Goal: Free from opioid side effects throughout the shift  Outcome: Progressing  Goal: Free from acute confusion related to pain meds throughout the shift  Outcome: Progressing

## 2024-01-17 NOTE — PROGRESS NOTES
Occupational Therapy    Evaluation    Patient Name: Jada Marlow  MRN: [id number]  Today's Date: 1/17/2024  Time Calculation  Start Time: 0936  Stop Time: 0948  Time Calculation (min): 12 min        Assessment:  End of Session Patient Position: Bed, 2 rail up (hob elevated to comfort, call light in reach, all needs met)     Plan:  No Skilled OT: No acute OT goals identified  OT Frequency: OT eval only  OT - OK to Discharge: Yes       Subjective   Current Problem:  1. Generalized weakness        2. Collapse          General:  General  Reason for Referral: OT eval and tx; ADLs. dx; #Generalized weakness  #Fall  #Gastritis  Referred By: Felix  Past Medical History Relevant to Rehab: 73 y.o. female with past medical history significant for DJD, BLADE, peptic ulcer disease and hypothyroidism presents today with complaints of generalized weakness.  Has a history of hiatal hernia with schedule to follow-up with Dr. Altman.  States she has been having sternal pain for the past couple weeks.  States that while she was in the process of trying to find his office, she collapsed.  States she was walking all around trying to find his office and became so weak that this caused her knees to feel weak and for her to collapse.  Admits to loss of appetite, states she has not been able to eat or drink properly for the last 3 weeks due to this.  Admits to weight loss secondary to loss of appetite.  Anytime she drinks anything, she feels nauseous.  States she has had issues with acid reflux.  Patient lives at an assisted living facility.  Uses a rollator for assistance with ambulation.  Does not use oxygen.  Admits to occasional headaches.  Denies dizziness, lightheadedness, shortness of breath, abdominal pain or nausea, or fevers or chills.  Denies any pain or burning with urination.  Co-Treatment: PT  Co-Treatment Reason: for safety and to maximize therapeutic performance  Prior to Session Communication: Bedside nurse  Preferred  Learning Style:  (patient lying in bed at start and end of eval, 2 rails up, call light in reach, all needs met. alarm off upon arrival. IV in place throughout)  General Comment: CT A&P: pulmonary nodules; negative PE  Precautions:  Medical Precautions:  (fall,clear liquid diet, Jamul, IV, corrective lenses full time)       Pain:  Pain Assessment  Pain Assessment:  (reports 6/10 pain in sternum)    Objective   Cognition:  Overall Cognitive Status: Within Functional Limits           Home Living:  Type of Home:  (lives at [facility]; independent with use of rollator and Independent in ADLs PLOF. facility manages IADLs. uses provide a ride or facility provides transport locally. sleeps in standard bed. denies falls)  Bathroom Shower/Tub:  (walk in shower, shower chair, grab bar, handheld shower)  Bathroom Toilet:  (high rise toilet, grab bar)    ADL:  LE Dressing Assistance:  (donns tennis shoes seated EOB with SBA)    Bed Mobility/Transfers: Bed Mobility  Bed Mobility:  (completed supine <-->sit with supervision)    Transfers  Transfer:  (completed STS with SBA with WW)      Ambulation/Gait Training:  Ambulation/Gait Training  Ambulation/Gait Training Performed:  (engaged in simple mobility with WW with SBA)     Sensation:  Sensation Comment: denies sensation changes  Strength:  Strength Comments: BUE ROM/MMT WFL      Outcome Measures:[facility] Daily Activity  Putting on and taking off regular lower body clothing: None  Bathing (including washing, rinsing, drying): None  Putting on and taking off regular upper body clothing: None  Toileting, which includes using toilet, bedpan or urinal: None  Taking care of personal grooming such as brushing teeth: None  Eating Meals: None  Daily Activity - Total Score: 24        Education Documentation  Body Mechanics, taught by Faiza Muller OT at 1/17/2024 11:32 AM.  Learner: Patient  Readiness: Acceptance  Method: Explanation  Response: Verbalizes Understanding    ADL Training, taught by  Faiza Muller OT at 1/17/2024 11:32 AM.  Learner: Patient  Readiness: Acceptance  Method: Explanation  Response: Verbalizes Understanding    Education Comments  No comments found.

## 2024-01-18 ENCOUNTER — ANESTHESIA (OUTPATIENT)
Dept: GASTROENTEROLOGY | Facility: HOSPITAL | Age: 74
DRG: 392 | End: 2024-01-18
Payer: MEDICARE

## 2024-01-18 ENCOUNTER — APPOINTMENT (OUTPATIENT)
Dept: GASTROENTEROLOGY | Facility: HOSPITAL | Age: 74
DRG: 392 | End: 2024-01-18
Payer: MEDICARE

## 2024-01-18 ENCOUNTER — ANESTHESIA EVENT (OUTPATIENT)
Dept: GASTROENTEROLOGY | Facility: HOSPITAL | Age: 74
DRG: 392 | End: 2024-01-18
Payer: MEDICARE

## 2024-01-18 PROBLEM — E03.9 HYPOTHYROIDISM: Status: ACTIVE | Noted: 2024-01-18

## 2024-01-18 PROBLEM — I10 HTN (HYPERTENSION): Status: ACTIVE | Noted: 2024-01-18

## 2024-01-18 PROBLEM — F03.90 DEMENTIA (MULTI): Status: ACTIVE | Noted: 2024-01-18

## 2024-01-18 PROBLEM — E78.5 HYPERLIPIDEMIA: Status: ACTIVE | Noted: 2024-01-18

## 2024-01-18 PROCEDURE — C1726 CATH, BAL DIL, NON-VASCULAR: HCPCS

## 2024-01-18 PROCEDURE — 0DB78ZX EXCISION OF STOMACH, PYLORUS, VIA NATURAL OR ARTIFICIAL OPENING ENDOSCOPIC, DIAGNOSTIC: ICD-10-PCS | Performed by: NURSE PRACTITIONER

## 2024-01-18 PROCEDURE — 1200000002 HC GENERAL ROOM WITH TELEMETRY DAILY

## 2024-01-18 PROCEDURE — 2500000001 HC RX 250 WO HCPCS SELF ADMINISTERED DRUGS (ALT 637 FOR MEDICARE OP): Performed by: INTERNAL MEDICINE

## 2024-01-18 PROCEDURE — 2500000004 HC RX 250 GENERAL PHARMACY W/ HCPCS (ALT 636 FOR OP/ED): Performed by: PHYSICIAN ASSISTANT

## 2024-01-18 PROCEDURE — 0753T DGTZ GLS MCRSCP SLD LEVEL IV: CPT | Mod: TC,SUR,PARLAB,WESLAB | Performed by: STUDENT IN AN ORGANIZED HEALTH CARE EDUCATION/TRAINING PROGRAM

## 2024-01-18 PROCEDURE — 0D748ZZ DILATION OF ESOPHAGOGASTRIC JUNCTION, VIA NATURAL OR ARTIFICIAL OPENING ENDOSCOPIC: ICD-10-PCS | Performed by: NURSE PRACTITIONER

## 2024-01-18 PROCEDURE — 7100000001 HC RECOVERY ROOM TIME - INITIAL BASE CHARGE

## 2024-01-18 PROCEDURE — 3700000001 HC GENERAL ANESTHESIA TIME - INITIAL BASE CHARGE

## 2024-01-18 PROCEDURE — A43239 PR EDG TRANSORAL BIOPSY SINGLE/MULTIPLE: Performed by: ANESTHESIOLOGY

## 2024-01-18 PROCEDURE — 2500000004 HC RX 250 GENERAL PHARMACY W/ HCPCS (ALT 636 FOR OP/ED): Performed by: INTERNAL MEDICINE

## 2024-01-18 PROCEDURE — 2500000005 HC RX 250 GENERAL PHARMACY W/O HCPCS: Performed by: ANESTHESIOLOGIST ASSISTANT

## 2024-01-18 PROCEDURE — 0DB38ZX EXCISION OF LOWER ESOPHAGUS, VIA NATURAL OR ARTIFICIAL OPENING ENDOSCOPIC, DIAGNOSTIC: ICD-10-PCS | Performed by: NURSE PRACTITIONER

## 2024-01-18 PROCEDURE — 2500000004 HC RX 250 GENERAL PHARMACY W/ HCPCS (ALT 636 FOR OP/ED): Performed by: ANESTHESIOLOGIST ASSISTANT

## 2024-01-18 PROCEDURE — 88341 IMHCHEM/IMCYTCHM EA ADD ANTB: CPT | Performed by: PATHOLOGY

## 2024-01-18 PROCEDURE — 3700000002 HC GENERAL ANESTHESIA TIME - EACH INCREMENTAL 1 MINUTE

## 2024-01-18 PROCEDURE — S4991 NICOTINE PATCH NONLEGEND: HCPCS | Performed by: PHYSICIAN ASSISTANT

## 2024-01-18 PROCEDURE — 2500000002 HC RX 250 W HCPCS SELF ADMINISTERED DRUGS (ALT 637 FOR MEDICARE OP, ALT 636 FOR OP/ED): Performed by: PHYSICIAN ASSISTANT

## 2024-01-18 PROCEDURE — 43239 EGD BIOPSY SINGLE/MULTIPLE: CPT | Performed by: STUDENT IN AN ORGANIZED HEALTH CARE EDUCATION/TRAINING PROGRAM

## 2024-01-18 PROCEDURE — 88342 IMHCHEM/IMCYTCHM 1ST ANTB: CPT | Performed by: PATHOLOGY

## 2024-01-18 PROCEDURE — 0DB18ZX EXCISION OF UPPER ESOPHAGUS, VIA NATURAL OR ARTIFICIAL OPENING ENDOSCOPIC, DIAGNOSTIC: ICD-10-PCS | Performed by: NURSE PRACTITIONER

## 2024-01-18 PROCEDURE — A43239 PR EDG TRANSORAL BIOPSY SINGLE/MULTIPLE: Performed by: ANESTHESIOLOGIST ASSISTANT

## 2024-01-18 PROCEDURE — 99100 ANES PT EXTEME AGE<1 YR&>70: CPT | Performed by: ANESTHESIOLOGY

## 2024-01-18 PROCEDURE — 2720000007 HC OR 272 NO HCPCS

## 2024-01-18 PROCEDURE — 88305 TISSUE EXAM BY PATHOLOGIST: CPT | Performed by: PATHOLOGY

## 2024-01-18 PROCEDURE — 7100000002 HC RECOVERY ROOM TIME - EACH INCREMENTAL 1 MINUTE

## 2024-01-18 RX ORDER — PROPOFOL 10 MG/ML
INJECTION, EMULSION INTRAVENOUS AS NEEDED
Status: DISCONTINUED | OUTPATIENT
Start: 2024-01-18 | End: 2024-01-18

## 2024-01-18 RX ORDER — LIDOCAINE HCL/PF 100 MG/5ML
SYRINGE (ML) INTRAVENOUS AS NEEDED
Status: DISCONTINUED | OUTPATIENT
Start: 2024-01-18 | End: 2024-01-18

## 2024-01-18 RX ORDER — SODIUM CHLORIDE, SODIUM LACTATE, POTASSIUM CHLORIDE, CALCIUM CHLORIDE 600; 310; 30; 20 MG/100ML; MG/100ML; MG/100ML; MG/100ML
INJECTION, SOLUTION INTRAVENOUS CONTINUOUS PRN
Status: DISCONTINUED | OUTPATIENT
Start: 2024-01-18 | End: 2024-01-18

## 2024-01-18 RX ORDER — PROPOFOL 10 MG/ML
INJECTION, EMULSION INTRAVENOUS CONTINUOUS PRN
Status: DISCONTINUED | OUTPATIENT
Start: 2024-01-18 | End: 2024-01-18

## 2024-01-18 RX ADMIN — SODIUM CHLORIDE, POTASSIUM CHLORIDE, SODIUM LACTATE AND CALCIUM CHLORIDE: 600; 310; 30; 20 INJECTION, SOLUTION INTRAVENOUS at 10:46

## 2024-01-18 RX ADMIN — LIDOCAINE HYDROCHLORIDE 50 MG: 20 INJECTION, SOLUTION INTRAVENOUS at 10:46

## 2024-01-18 RX ADMIN — PROPOFOL 200 MCG/KG/MIN: 10 INJECTION, EMULSION INTRAVENOUS at 10:46

## 2024-01-18 RX ADMIN — TIZANIDINE 4 MG: 4 TABLET ORAL at 16:41

## 2024-01-18 RX ADMIN — MORPHINE SULFATE 2 MG: 2 INJECTION, SOLUTION INTRAMUSCULAR; INTRAVENOUS at 05:26

## 2024-01-18 RX ADMIN — LORATADINE 10 MG: 10 TABLET ORAL at 08:33

## 2024-01-18 RX ADMIN — TIZANIDINE 4 MG: 4 TABLET ORAL at 00:55

## 2024-01-18 RX ADMIN — QUETIAPINE FUMARATE 25 MG: 25 TABLET ORAL at 05:44

## 2024-01-18 RX ADMIN — LEVOTHYROXINE SODIUM 150 MCG: 150 TABLET ORAL at 05:26

## 2024-01-18 RX ADMIN — MORPHINE SULFATE 2 MG: 2 INJECTION, SOLUTION INTRAMUSCULAR; INTRAVENOUS at 00:57

## 2024-01-18 RX ADMIN — MORPHINE SULFATE 2 MG: 2 INJECTION, SOLUTION INTRAMUSCULAR; INTRAVENOUS at 19:58

## 2024-01-18 RX ADMIN — Medication 1 PATCH: at 08:33

## 2024-01-18 RX ADMIN — ATORVASTATIN CALCIUM 40 MG: 40 TABLET, FILM COATED ORAL at 08:33

## 2024-01-18 RX ADMIN — SODIUM CHLORIDE, POTASSIUM CHLORIDE, SODIUM LACTATE AND CALCIUM CHLORIDE 1000 ML: 600; 310; 30; 20 INJECTION, SOLUTION INTRAVENOUS at 11:15

## 2024-01-18 RX ADMIN — ONDANSETRON 4 MG: 2 INJECTION INTRAMUSCULAR; INTRAVENOUS at 19:59

## 2024-01-18 RX ADMIN — TIZANIDINE 4 MG: 4 TABLET ORAL at 08:33

## 2024-01-18 RX ADMIN — QUETIAPINE FUMARATE 25 MG: 25 TABLET ORAL at 20:00

## 2024-01-18 RX ADMIN — TRAZODONE HYDROCHLORIDE 150 MG: 50 TABLET ORAL at 20:00

## 2024-01-18 RX ADMIN — FLUOXETINE HYDROCHLORIDE 20 MG: 20 CAPSULE ORAL at 08:33

## 2024-01-18 RX ADMIN — QUETIAPINE FUMARATE 25 MG: 25 TABLET ORAL at 12:56

## 2024-01-18 RX ADMIN — PANTOPRAZOLE SODIUM 40 MG: 40 TABLET, DELAYED RELEASE ORAL at 05:45

## 2024-01-18 RX ADMIN — MORPHINE SULFATE 2 MG: 2 INJECTION, SOLUTION INTRAMUSCULAR; INTRAVENOUS at 11:28

## 2024-01-18 RX ADMIN — PROPOFOL 30 MG: 10 INJECTION, EMULSION INTRAVENOUS at 10:46

## 2024-01-18 RX ADMIN — QUETIAPINE FUMARATE 25 MG: 25 TABLET ORAL at 16:41

## 2024-01-18 SDOH — HEALTH STABILITY: MENTAL HEALTH: CURRENT SMOKER: 0

## 2024-01-18 ASSESSMENT — COGNITIVE AND FUNCTIONAL STATUS - GENERAL
DAILY ACTIVITIY SCORE: 24
TURNING FROM BACK TO SIDE WHILE IN FLAT BAD: A LITTLE
MOVING TO AND FROM BED TO CHAIR: A LITTLE
MOBILITY SCORE: 19
WALKING IN HOSPITAL ROOM: A LITTLE
STANDING UP FROM CHAIR USING ARMS: A LITTLE
CLIMB 3 TO 5 STEPS WITH RAILING: A LITTLE

## 2024-01-18 ASSESSMENT — PAIN SCALES - GENERAL
PAINLEVEL_OUTOF10: 0 - NO PAIN
PAINLEVEL_OUTOF10: 7
PAINLEVEL_OUTOF10: 0 - NO PAIN
PAINLEVEL_OUTOF10: 0 - NO PAIN

## 2024-01-18 ASSESSMENT — PAIN - FUNCTIONAL ASSESSMENT
PAIN_FUNCTIONAL_ASSESSMENT: 0-10

## 2024-01-18 ASSESSMENT — PAIN DESCRIPTION - ORIENTATION: ORIENTATION: MID

## 2024-01-18 ASSESSMENT — PAIN DESCRIPTION - LOCATION: LOCATION: CHEST

## 2024-01-18 NOTE — CARE PLAN
The patient's goals for the shift include      The clinical goals for the shift include to decrease pain    Over the shift, the patient did not make progress toward the following goals. Barriers to progression include acute illness. Recommendations to address these barriers include communication.

## 2024-01-18 NOTE — CARE PLAN
The patient's goals for the shift include      The clinical goals for the shift include remain comfortable    Over the shift, the patient did not make progress toward the following goals. Barriers to progression include . Recommendations to address these barriers include .

## 2024-01-18 NOTE — ANESTHESIA PREPROCEDURE EVALUATION
Patient: Jada Marlow    Procedure Information       Date/Time: 01/18/24 1000    Scheduled providers: Leobardo Luong MD; Brendan Weber MD    Procedure: EGD    Location: [facility]            Relevant Problems   Anesthesia (within normal limits)   (-) PONV (postoperative nausea and vomiting)      Cardiovascular   (+) HTN (hypertension)   (+) Hyperlipidemia      Endocrine   (+) Hypothyroidism      GI   (+) Hiatal hernia with gastroesophageal reflux      Neuro/Psych   (+) Dementia (CMS/HCC)       Clinical information reviewed:                   NPO Detail:  No data recorded     Physical Exam    Airway  Mallampati: I  TM distance: >3 FB  Neck ROM: full     Cardiovascular - normal exam  Rhythm: regular  Rate: normal     Dental   (+) upper dentures, lower dentures     Pulmonary - normal exam     Abdominal            Anesthesia Plan    History of general anesthesia?: yes  History of complications of general anesthesia?: no    ASA 3     MAC     The patient is not a current smoker.  Education provided regarding risk of obstructive sleep apnea.  intravenous induction   Anesthetic plan and risks discussed with patient.    Plan discussed with CRNA, CAA and attending.

## 2024-01-18 NOTE — ANESTHESIA POSTPROCEDURE EVALUATION
Patient: Jada Marlow    Procedure Summary       Date: 01/18/24 Room / Location: [facility]    Anesthesia Start: 1044 Anesthesia Stop: 1117    Procedure: EGD Diagnosis: Hiatal hernia with gastroesophageal reflux    Scheduled Providers: Leobardo Luong MD; Brendan Weber MD Responsible Provider: Brendan Weber MD    Anesthesia Type: MAC ASA Status: 3            Anesthesia Type: MAC    Vitals Value Taken Time   /53 01/18/24 1121   Temp 36.5 °C (97.7 °F) 01/18/24 1107   Pulse 69 01/18/24 1126   Resp 27 01/18/24 1126   SpO2 92 % 01/18/24 1126   Vitals shown include unvalidated device data.    Anesthesia Post Evaluation    Patient location during evaluation: PACU  Patient participation: complete - patient participated  Level of consciousness: awake and alert  Pain management: satisfactory to patient  Airway patency: patent  Cardiovascular status: acceptable  Respiratory status: acceptable  Hydration status: acceptable  Postoperative Nausea and Vomiting: none        No notable events documented.

## 2024-01-19 PROCEDURE — 99232 SBSQ HOSP IP/OBS MODERATE 35: CPT | Performed by: NURSE PRACTITIONER

## 2024-01-19 PROCEDURE — 1200000002 HC GENERAL ROOM WITH TELEMETRY DAILY

## 2024-01-19 PROCEDURE — S4991 NICOTINE PATCH NONLEGEND: HCPCS | Performed by: PHYSICIAN ASSISTANT

## 2024-01-19 PROCEDURE — 2500000002 HC RX 250 W HCPCS SELF ADMINISTERED DRUGS (ALT 637 FOR MEDICARE OP, ALT 636 FOR OP/ED): Performed by: PHYSICIAN ASSISTANT

## 2024-01-19 PROCEDURE — 2500000004 HC RX 250 GENERAL PHARMACY W/ HCPCS (ALT 636 FOR OP/ED): Performed by: INTERNAL MEDICINE

## 2024-01-19 PROCEDURE — 2500000001 HC RX 250 WO HCPCS SELF ADMINISTERED DRUGS (ALT 637 FOR MEDICARE OP): Performed by: INTERNAL MEDICINE

## 2024-01-19 PROCEDURE — 2500000004 HC RX 250 GENERAL PHARMACY W/ HCPCS (ALT 636 FOR OP/ED): Performed by: PHYSICIAN ASSISTANT

## 2024-01-19 RX ORDER — HYDROCODONE BITARTRATE AND ACETAMINOPHEN 5; 325 MG/1; MG/1
1 TABLET ORAL 2 TIMES DAILY
Status: DISCONTINUED | OUTPATIENT
Start: 2024-01-19 | End: 2024-01-20 | Stop reason: HOSPADM

## 2024-01-19 RX ORDER — ALBUTEROL SULFATE 90 UG/1
2 AEROSOL, METERED RESPIRATORY (INHALATION) EVERY 2 HOUR PRN
Status: DISCONTINUED | OUTPATIENT
Start: 2024-01-19 | End: 2024-01-20 | Stop reason: HOSPADM

## 2024-01-19 RX ORDER — PANTOPRAZOLE SODIUM 40 MG/1
40 TABLET, DELAYED RELEASE ORAL
Qty: 60 TABLET | Refills: 1 | Status: SHIPPED | OUTPATIENT
Start: 2024-01-19 | End: 2024-01-19 | Stop reason: SDUPTHER

## 2024-01-19 RX ORDER — PANTOPRAZOLE SODIUM 40 MG/1
40 TABLET, DELAYED RELEASE ORAL
Qty: 60 TABLET | Refills: 1 | Status: SHIPPED | OUTPATIENT
Start: 2024-01-19 | End: 2024-03-19

## 2024-01-19 RX ORDER — HYDROCODONE BITARTRATE AND ACETAMINOPHEN 5; 325 MG/1; MG/1
1 TABLET ORAL 2 TIMES DAILY
Qty: 14 TABLET | Refills: 0 | Status: SHIPPED | OUTPATIENT
Start: 2024-01-19 | End: 2024-01-26

## 2024-01-19 RX ORDER — PANTOPRAZOLE SODIUM 40 MG/1
40 TABLET, DELAYED RELEASE ORAL
Status: DISCONTINUED | OUTPATIENT
Start: 2024-01-19 | End: 2024-01-20 | Stop reason: HOSPADM

## 2024-01-19 RX ORDER — HYDROCODONE BITARTRATE AND ACETAMINOPHEN 5; 325 MG/1; MG/1
1 TABLET ORAL 2 TIMES DAILY
Qty: 14 TABLET | Refills: 0 | Status: SHIPPED | OUTPATIENT
Start: 2024-01-19 | End: 2024-01-19 | Stop reason: SDUPTHER

## 2024-01-19 RX ADMIN — Medication 1 PATCH: at 10:01

## 2024-01-19 RX ADMIN — QUETIAPINE FUMARATE 25 MG: 25 TABLET ORAL at 20:03

## 2024-01-19 RX ADMIN — QUETIAPINE FUMARATE 25 MG: 25 TABLET ORAL at 05:24

## 2024-01-19 RX ADMIN — TIZANIDINE 4 MG: 4 TABLET ORAL at 10:01

## 2024-01-19 RX ADMIN — HYDROCODONE BITARTRATE AND ACETAMINOPHEN 1 TABLET: 5; 325 TABLET ORAL at 13:11

## 2024-01-19 RX ADMIN — TIZANIDINE 4 MG: 4 TABLET ORAL at 16:06

## 2024-01-19 RX ADMIN — ATORVASTATIN CALCIUM 40 MG: 40 TABLET, FILM COATED ORAL at 20:03

## 2024-01-19 RX ADMIN — PANTOPRAZOLE SODIUM 40 MG: 40 TABLET, DELAYED RELEASE ORAL at 05:24

## 2024-01-19 RX ADMIN — HYDROCODONE BITARTRATE AND ACETAMINOPHEN 1 TABLET: 5; 325 TABLET ORAL at 20:03

## 2024-01-19 RX ADMIN — LORATADINE 10 MG: 10 TABLET ORAL at 10:01

## 2024-01-19 RX ADMIN — ENOXAPARIN SODIUM 40 MG: 40 INJECTION SUBCUTANEOUS at 10:01

## 2024-01-19 RX ADMIN — FLUOXETINE HYDROCHLORIDE 20 MG: 20 CAPSULE ORAL at 10:01

## 2024-01-19 RX ADMIN — QUETIAPINE FUMARATE 25 MG: 25 TABLET ORAL at 16:06

## 2024-01-19 RX ADMIN — LEVOTHYROXINE SODIUM 150 MCG: 150 TABLET ORAL at 05:23

## 2024-01-19 RX ADMIN — QUETIAPINE FUMARATE 25 MG: 25 TABLET ORAL at 13:11

## 2024-01-19 RX ADMIN — PANTOPRAZOLE SODIUM 40 MG: 40 TABLET, DELAYED RELEASE ORAL at 16:06

## 2024-01-19 RX ADMIN — TRAZODONE HYDROCHLORIDE 150 MG: 50 TABLET ORAL at 22:50

## 2024-01-19 ASSESSMENT — COGNITIVE AND FUNCTIONAL STATUS - GENERAL
CLIMB 3 TO 5 STEPS WITH RAILING: A LITTLE
STANDING UP FROM CHAIR USING ARMS: A LITTLE
DAILY ACTIVITIY SCORE: 24
MOVING TO AND FROM BED TO CHAIR: A LITTLE
MOBILITY SCORE: 19
WALKING IN HOSPITAL ROOM: A LITTLE
TURNING FROM BACK TO SIDE WHILE IN FLAT BAD: A LITTLE

## 2024-01-19 ASSESSMENT — PAIN - FUNCTIONAL ASSESSMENT
PAIN_FUNCTIONAL_ASSESSMENT: 0-10
PAIN_FUNCTIONAL_ASSESSMENT: 0-10

## 2024-01-19 ASSESSMENT — PAIN SCALES - GENERAL
PAINLEVEL_OUTOF10: 7
PAINLEVEL_OUTOF10: 0 - NO PAIN

## 2024-01-19 ASSESSMENT — PAIN DESCRIPTION - LOCATION: LOCATION: CHEST

## 2024-01-19 NOTE — PROGRESS NOTES
"Jada Marlow is a 73 y.o. female on day 2 of admission presenting with General weakness.    This note was created using voice recognition transcription software. Despite proofreading, unintentional typographical errors may be present. Please contact the GI office with any questions or concerns.       Subjective  Eating a soft diet well.  No dysphagia or odynophagia.  No abd pain, n/v.  Last BM was \"awhile ago\".  Hasn't been eating prior to today.        Physical Exam:  General: Polite, calm, resting  Skin:  Warm and dry, no jaundice  HEENT: No scleral icterus, no conjunctival pallor, normocephalic, atraumatic, mucous membranes moist  Neck:  atraumatic, trachea midline, no JVD  Chest:  Clear to auscultation bilaterally. No wheezes, rales, or rhonchi  CV:  Regular rate and rhythm.  Positive S1/S2  Abdomen: no distension, +BS, soft, non-tender to palpation, no rebound tenderness, no guarding, no rigidity, no discernible ascites   Extremities: no lower extremity edema, chronic pigmentary changes, no cyanosis  Neurological:  A&Ox3  Psychiatric: cooperative     Investigations:  Labs, radiological imaging and cardiac work up were reviewed        Objective:         1/18/2024    11:30 AM 1/18/2024    11:49 AM 1/18/2024     6:44 PM 1/18/2024     7:39 PM 1/18/2024     9:45 PM 1/19/2024     1:20 AM 1/19/2024     5:00 AM   Vitals   Systolic 122 126 104 112  141 171   Diastolic 59 61 51 56  65 72   Heart Rate 70 69 68 61  78 86   Temp  36.1 °C (97 °F) 37 °C (98.6 °F) 36.4 °C (97.5 °F)  36.3 °C (97.3 °F) 36.8 °C (98.2 °F)   Resp 24 20  18  16 18   Height (in)     1.524 m (5')     Weight (lb)     141.09  141.32   BMI     27.56 kg/m2  27.6 kg/m2   BSA (m2)     1.65 m2  1.65 m2          Medications:  atorvastatin, 40 mg, oral, Daily  enoxaparin, 40 mg, subcutaneous, q24h  FLUoxetine, 20 mg, oral, Daily  levothyroxine, 150 mcg, oral, Daily  loratadine, 10 mg, oral, Daily  nicotine, 1 patch, transdermal, Daily  pantoprazole, 40 mg, " oral, Daily before breakfast  QUEtiapine, 25 mg, oral, 4x daily  tiZANidine, 4 mg, oral, q8h  traZODone, 150 mg, oral, Nightly         Recent Results (from the past 72 hour(s))   CBC and Auto Differential    Collection Time: 01/16/24  4:55 PM   Result Value Ref Range    WBC 5.4 4.4 - 11.3 x10*3/uL    nRBC 0.0 0.0 - 0.0 /100 WBCs    RBC 3.92 (L) 4.00 - 5.20 x10*6/uL    Hemoglobin 12.3 12.0 - 16.0 g/dL    Hematocrit 37.1 36.0 - 46.0 %    MCV 95 80 - 100 fL    MCH 31.4 26.0 - 34.0 pg    MCHC 33.2 32.0 - 36.0 g/dL    RDW 12.9 11.5 - 14.5 %    Platelets 216 150 - 450 x10*3/uL    Neutrophils % 60.0 40.0 - 80.0 %    Immature Granulocytes %, Automated 0.2 0.0 - 0.9 %    Lymphocytes % 31.1 13.0 - 44.0 %    Monocytes % 7.9 2.0 - 10.0 %    Eosinophils % 0.4 0.0 - 6.0 %    Basophils % 0.4 0.0 - 2.0 %    Neutrophils Absolute 3.25 1.60 - 5.50 x10*3/uL    Immature Granulocytes Absolute, Automated 0.01 0.00 - 0.50 x10*3/uL    Lymphocytes Absolute 1.68 0.80 - 3.00 x10*3/uL    Monocytes Absolute 0.43 0.05 - 0.80 x10*3/uL    Eosinophils Absolute 0.02 0.00 - 0.40 x10*3/uL    Basophils Absolute 0.02 0.00 - 0.10 x10*3/uL   Comprehensive metabolic panel    Collection Time: 01/16/24  4:55 PM   Result Value Ref Range    Glucose 92 74 - 99 mg/dL    Sodium 137 136 - 145 mmol/L    Potassium 3.9 3.5 - 5.3 mmol/L    Chloride 108 (H) 98 - 107 mmol/L    Bicarbonate 24 21 - 32 mmol/L    Anion Gap 9 (L) 10 - 20 mmol/L    Urea Nitrogen 10 6 - 23 mg/dL    Creatinine 0.65 0.50 - 1.05 mg/dL    eGFR >90 >60 mL/min/1.73m*2    Calcium 8.7 8.6 - 10.3 mg/dL    Albumin 3.3 (L) 3.4 - 5.0 g/dL    Alkaline Phosphatase 91 33 - 136 U/L    Total Protein 5.8 (L) 6.4 - 8.2 g/dL    AST 26 9 - 39 U/L    Bilirubin, Total 0.8 0.0 - 1.2 mg/dL    ALT 17 7 - 45 U/L   Protime-INR    Collection Time: 01/16/24  4:55 PM   Result Value Ref Range    Protime 12.5 9.8 - 12.8 seconds    INR 1.1 0.9 - 1.1   aPTT    Collection Time: 01/16/24  4:55 PM   Result Value Ref Range    aPTT  38 27 - 38 seconds   Troponin I, High Sensitivity    Collection Time: 01/16/24  4:55 PM   Result Value Ref Range    Troponin I, High Sensitivity 7 0 - 13 ng/L   B-Type Natriuretic Peptide    Collection Time: 01/16/24  4:55 PM   Result Value Ref Range    BNP 88 0 - 99 pg/mL   Urinalysis with Reflex Culture and Microscopic    Collection Time: 01/16/24  5:45 PM   Result Value Ref Range    Color, Urine Yellow Straw, Yellow    Appearance, Urine Clear Clear    Specific Gravity, Urine 1.017 1.005 - 1.035    pH, Urine 5.0 5.0, 5.5, 6.0, 6.5, 7.0, 7.5, 8.0    Protein, Urine NEGATIVE NEGATIVE mg/dL    Glucose, Urine NEGATIVE NEGATIVE mg/dL    Blood, Urine NEGATIVE NEGATIVE    Ketones, Urine NEGATIVE NEGATIVE mg/dL    Bilirubin, Urine NEGATIVE NEGATIVE    Urobilinogen, Urine <2.0 <2.0 mg/dL    Nitrite, Urine NEGATIVE NEGATIVE    Leukocyte Esterase, Urine TRACE (A) NEGATIVE   Extra Urine Gray Tube    Collection Time: 01/16/24  5:45 PM   Result Value Ref Range    Extra Tube Hold for add-ons.    Microscopic Only, Urine    Collection Time: 01/16/24  5:45 PM   Result Value Ref Range    WBC, Urine 1-5 1-5, NONE /HPF    RBC, Urine 1-2 NONE, 1-2, 3-5 /HPF    Mucus, Urine 1+ Reference range not established. /LPF    Hyaline Casts, Urine 1+ (A) NONE /LPF   Urine Culture    Collection Time: 01/16/24  5:45 PM    Specimen: Clean Catch/Voided; Urine   Result Value Ref Range    Urine Culture No significant growth    Troponin I, High Sensitivity    Collection Time: 01/16/24  9:34 PM   Result Value Ref Range    Troponin I, High Sensitivity 10 0 - 13 ng/L   CBC    Collection Time: 01/17/24  7:35 AM   Result Value Ref Range    WBC 4.3 (L) 4.4 - 11.3 x10*3/uL    nRBC 0.0 0.0 - 0.0 /100 WBCs    RBC 3.69 (L) 4.00 - 5.20 x10*6/uL    Hemoglobin 11.5 (L) 12.0 - 16.0 g/dL    Hematocrit 35.3 (L) 36.0 - 46.0 %    MCV 96 80 - 100 fL    MCH 31.2 26.0 - 34.0 pg    MCHC 32.6 32.0 - 36.0 g/dL    RDW 12.8 11.5 - 14.5 %    Platelets 187 150 - 450 x10*3/uL    Basic metabolic panel    Collection Time: 01/17/24  7:35 AM   Result Value Ref Range    Glucose 89 74 - 99 mg/dL    Sodium 140 136 - 145 mmol/L    Potassium 4.0 3.5 - 5.3 mmol/L    Chloride 111 (H) 98 - 107 mmol/L    Bicarbonate 24 21 - 32 mmol/L    Anion Gap 9 (L) 10 - 20 mmol/L    Urea Nitrogen 6 6 - 23 mg/dL    Creatinine 0.53 0.50 - 1.05 mg/dL    eGFR >90 >60 mL/min/1.73m*2    Calcium 7.6 (L) 8.6 - 10.3 mg/dL          Assessment:  This is a 74 yo F with a PMH of DJD, BLADE, PUD and hypothyroidism who presented to [facility] on 1/16/24 with reports of generalized weakness.  GI consulted on 1/17/24 for hiatal hernia, sternal pain, loss of appetite.  EGD on 1/18/24 showed mild stenosis at GEJ likely secondary to stricture vs. Underlying GEJOO, medium T1HH, mild erythema in body of stomach.  Dilation occurred at 20 mm with moderate resistance.  Patient doing well today with no dysphagia.  Tolerating a soft diet well.        Plan  1.)  Dysphagia-Continue soft diet for a week or so.  Continue PPI.  Will sign off.  Please call if you need anything else.      Discussed patient with Dr. Jefferson Spaulding.    I spent 45 minutes in the professional and overall care of this patient.      01/19/24 at 8:09 AM - CARMINE Griffin-CNP

## 2024-01-19 NOTE — PROGRESS NOTES
4:45 received fax from staff, patient as active with [facility] prior to admission. Spoke to patient, she confirmed that she was active. Sent return referral to [facility], Requested external [facility] referral from MD.    Met with the patient in the room, she states that she lives at [facility] in [place] with a partner. She states that she uses a rollator when  she feels weak. She is provided meals at the assisted living, they help with showering. Her plan is to return to [facility] when discharged. She states that she will need transportation to return there.

## 2024-01-19 NOTE — PROGRESS NOTES
Jada Marlow is a 73 y.o. female on day 1 of admission presenting with General weakness.      Subjective   Patient is awake and alert oriented x 3.  She is a status post upper endoscopy with dilatation of the esophagus.  Biopsies were taken.  Patient has been started on a full liquid diet.       Objective   Patient is fully awake and alert oriented x 3 no distress, currently on nasal cannula,    Lungs bilateral clear to auscultation    Heart regular S1-S2    Abdomen soft and nontender    Extremities no edema  Last Recorded Vitals  /56   Pulse 61   Temp 36.4 °C (97.5 °F)   Resp 18   Wt 64 kg (141 lb 1.5 oz)   SpO2 93%   Intake/Output last 3 Shifts:    Intake/Output Summary (Last 24 hours) at 1/18/2024 2043  Last data filed at 1/18/2024 1148  Gross per 24 hour   Intake 746.76 ml   Output --   Net 746.76 ml       Admission Weight  Weight: 45.4 kg (100 lb) (01/16/24 1627)    Daily Weight  01/18/24 : 64 kg (141 lb 1.5 oz)    Image Results  EGD  Table formatting from the original result was not included.  Impression  Mildly stenotic GEJ which may be secondary to a stricture vs underlying   GEJOO. If symptoms do not improve with interventions today, an outpatient   manometry may be required.   Stricture with length of 2 cm and diameter of 13 mm in the GE junction;   dilated 1 step - (step 1) dilated to 20 mm. Dilation caused mucosal tears,   improved passage of the scope and improved lumen appearance  Medium type I hiatal hernia  Mild erythematous mucosa in the body of the stomach and duodenal bulb;   performed cold forceps biopsy  Performed forceps biopsies in the upper third of the esophagus and lower   third of the esophagus to rule out eosinophilic esophagitis    Findings  Benign-appearing stricture (traversable) with length of 2 cm and diameter   of 13 mm in the GE junction; dilated 1 step - (step 1) dilated to 20 mm   for 45 s with moderate resistance. Dilation caused mucosal tears, improved   passage of  the scope and improved lumen appearance. The GEJ felt mildly   stenotic which may have been  Medium sliding hiatal hernia (type I hiatal hernia) without Rl   lesions present - GE junction 37 cm from the incisors, diaphragmatic   impression 40 cm from the incisors, confirmed by retroflexion. Hill grade   III hiatal hernia  Mild, patchy erythematous mucosa in the body of the stomach and duodenal   bulb; performed cold forceps biopsy to rule out H. pylori;  Performed multiple random forceps biopsies in the upper third of the   esophagus and lower third of the esophagus to rule out eosinophilic   esophagitis    Recommendation   Await pathology results    Follow up with primary gastroenterologist     Indication  Hiatal hernia with gastroesophageal reflux    Staff  Staff Role   Leobardo Luong MD Proceduralist     Medications  See Anesthesia Record.     Preprocedure  A history and physical has been performed, and patient medication   allergies have been reviewed. The patient's tolerance of previous   anesthesia has been reviewed. The risks and benefits of the procedure and   the sedation options and risks were discussed with the patient. All   questions were answered and informed consent obtained.    Details of the Procedure  The patient underwent monitored anesthesia care, which was administered by   an anesthesia professional. The patient's blood pressure, ECG, ETCO2,   heart rate, level of consciousness, oxygen and respirations were monitored   throughout the procedure. The scope was introduced through the mouth and   advanced to the second part of the duodenum. Retroflexion was performed in   the cardia. Prior to the procedure, the patient's H. Pylori status was   unknown. The patient experienced no blood loss. The procedure was not   difficult. The patient tolerated the procedure well. There were no   apparent adverse events.     Events  Procedure Events   Event Event Time   ENDO SCOPE IN TIME 1/18/2024 10:51 AM    ENDO SCOPE OUT TIME 1/18/2024 11:02 AM     Specimens  ID Type Source Tests Collected by Time   1 : gastric bx's, cold bx Tissue STOMACH ANTRUM BIOPSY SURGICAL PATHOLOGY   EXAM Leobardo Luong MD 1/18/2024 1053   2 : distal esophagus bx's, cold bx Tissue ESOPHAGUS DISTAL BIOPSY SURGICAL   PATHOLOGY EXAM Leobardo Luong MD 1/18/2024 1056   3 : proximal esophagus bx's, cold bx Tissue ESOPHAGUS PROXIMAL BIOPSY   SURGICAL PATHOLOGY EXAM Leobardo Luong MD 1/18/2024 1057     Procedure Location  [facility]  [address] [facility] [address]  [phone]    Referring Provider  No referring provider defined for this encounter.    Procedure Provider  No name on file      Physical Exam    Relevant Results               Assessment/Plan      #1 dysphagia, status post upper endoscopy with dilatation of the esophagus    Patient was started on a diet, would advance as tolerates and plan to discharge home tomorrow            Principal Problem:    General weakness  Active Problems:    Generalized weakness                  Brendan Mayfield MD

## 2024-01-19 NOTE — CONSULTS
Nutrition Note  Reason for Assessment  Reason for Assessment: Admission nursing screening    Visit Reason: generalized weakness  Recommendation(s):  continue diet texture per pt need, defer supplement at this time, (pt said that it makes her gain weight when she drinks them)         Nutrition Assessment    Nutrition History  Food and Nutrient History: Pt is feeling better and has been doing pretty well with her liquids.  She was advanced to solids today      Difficulty swallowing: pt is on an easy to chew diet     Per Flowsheet Percent Meal intake:    Dietary Orders (From admission, onward)       Start     Ordered    01/18/24 1408  Adult diet Regular; Easy to chew  Diet effective now        Comments: Ok to advance to soft solid at dinned time as tolerated   Question Answer Comment   Diet type Regular    Texture Easy to chew        01/18/24 1408    01/17/24 0008  May Participate in Room Service  Once        Question:  .  Answer:  Yes    01/17/24 0007                     Results from last 7 days   Lab Units 01/17/24  0735 01/16/24  1655   GLUCOSE mg/dL 89 92   SODIUM mmol/L 140 137   POTASSIUM mmol/L 4.0 3.9   CHLORIDE mmol/L 111* 108*   CO2 mmol/L 24 24   BUN mg/dL 6 10   CREATININE mg/dL 0.53 0.65   EGFR mL/min/1.73m*2 >90 >90   CALCIUM mg/dL 7.6* 8.7     Lab Results   Component Value Date    HGBA1C 5.6 11/24/2020         GI per flowsheet:  Gastrointestinal  Gastrointestinal (WDL): Within Defined Limits  Gastrointestinal Symptoms: Heartburn  Last bowel movement documented:    PMH: hiatal hernia; with GERD: recent sternal pain; 3 weeks of not eating well; nausea issues;   Allergies: Aspirin and Vancomycin     Anthropometrics:  Height: 152.4 cm (5')  Weight: 64 kg (141 lb 1.5 oz)  BMI (Calculated): 27.56  IBW: 45 kg  %IBW: 140 %              Estimated Nutritional Needs:  Method for Estimating Needs: 6567-9154  26-30 paul kg of IBW    Method for Estimating Needs: 45-54  1-1.2 gm kg of IBW    Method for Estimating Needs:  900-1350  as medically indicated  20-30 ml kg of IBW    Nutrition Focused Physical Findings:   Orbital Fat Pads:  (not in a good position)        Pain Score: 7     Nutrition Diagnosis        Patient has Nutrition Diagnosis: Yes  New  Nutrition Diagnosis 1: Swallowing difficulity  Related to (1): motor and mech causes  As Evidenced by (1): pt is on an easy to chew diet          Nutrition Interventions/Recommendations   Interventions        Individualized Nutrition Prescription Provided for : continue diet texture per pt need,  defer supplement at this time,  (pt said that it makes her gain weight when she drinks them)    Nutrition Monitoring and Evaluation   Biochemical Data, Medical Tests and Procedures  Monitoring and Evaluation Plan: Electrolyte/renal panel, Glucose/endocrine profile  Food/Nutrient Related History Monitoring  Monitoring and Evaluation Plan: Energy intake, Fluid intake, Amount of food    Progress towards goals: Partially Met    Education Documentation  No documentation found.      Time Spent (min): 45 minutes  Last Date of Nutrition Visit: 01/18/24  Nutrition Follow-Up Needed?: 3-8 days  Follow up Comment: 1/24  GABRIELE

## 2024-01-19 NOTE — CARE PLAN
The patient's goals for the shift include  remain safe throughout the shift.    The clinical goals for the shift include remain free from fa;ls    Over the shift, the patient did not make progress toward the following goals. Barriers to progression include acute illness. Recommendations to address these barriers include communication.

## 2024-01-19 NOTE — CARE PLAN
The patient's goals for the shift include      The clinical goals for the shift include remain free from fa;ls    Over the shift, the patient did not make progress toward the following goals. Barriers to progression include . Recommendations to address these barriers include .

## 2024-01-20 VITALS
WEIGHT: 140.65 LBS | TEMPERATURE: 97.9 F | DIASTOLIC BLOOD PRESSURE: 67 MMHG | HEIGHT: 60 IN | SYSTOLIC BLOOD PRESSURE: 142 MMHG | HEART RATE: 89 BPM | OXYGEN SATURATION: 92 % | BODY MASS INDEX: 27.61 KG/M2 | RESPIRATION RATE: 18 BRPM

## 2024-01-20 PROCEDURE — 2500000004 HC RX 250 GENERAL PHARMACY W/ HCPCS (ALT 636 FOR OP/ED): Performed by: INTERNAL MEDICINE

## 2024-01-20 PROCEDURE — 2500000002 HC RX 250 W HCPCS SELF ADMINISTERED DRUGS (ALT 637 FOR MEDICARE OP, ALT 636 FOR OP/ED): Performed by: PHYSICIAN ASSISTANT

## 2024-01-20 PROCEDURE — S4991 NICOTINE PATCH NONLEGEND: HCPCS | Performed by: PHYSICIAN ASSISTANT

## 2024-01-20 PROCEDURE — 2500000001 HC RX 250 WO HCPCS SELF ADMINISTERED DRUGS (ALT 637 FOR MEDICARE OP): Performed by: INTERNAL MEDICINE

## 2024-01-20 RX ADMIN — Medication 1 PATCH: at 09:32

## 2024-01-20 RX ADMIN — HYDROCODONE BITARTRATE AND ACETAMINOPHEN 1 TABLET: 5; 325 TABLET ORAL at 09:31

## 2024-01-20 RX ADMIN — LEVOTHYROXINE SODIUM 150 MCG: 150 TABLET ORAL at 06:05

## 2024-01-20 RX ADMIN — QUETIAPINE FUMARATE 25 MG: 25 TABLET ORAL at 13:06

## 2024-01-20 RX ADMIN — PANTOPRAZOLE SODIUM 40 MG: 40 TABLET, DELAYED RELEASE ORAL at 06:05

## 2024-01-20 RX ADMIN — QUETIAPINE FUMARATE 25 MG: 25 TABLET ORAL at 06:05

## 2024-01-20 RX ADMIN — TIZANIDINE 4 MG: 4 TABLET ORAL at 09:31

## 2024-01-20 RX ADMIN — FLUOXETINE HYDROCHLORIDE 20 MG: 20 CAPSULE ORAL at 09:31

## 2024-01-20 RX ADMIN — TIZANIDINE 4 MG: 4 TABLET ORAL at 00:04

## 2024-01-20 RX ADMIN — LORATADINE 10 MG: 10 TABLET ORAL at 09:31

## 2024-01-20 ASSESSMENT — COGNITIVE AND FUNCTIONAL STATUS - GENERAL
WALKING IN HOSPITAL ROOM: A LITTLE
DAILY ACTIVITIY SCORE: 24
TURNING FROM BACK TO SIDE WHILE IN FLAT BAD: A LITTLE
CLIMB 3 TO 5 STEPS WITH RAILING: A LITTLE
MOVING TO AND FROM BED TO CHAIR: A LITTLE
TURNING FROM BACK TO SIDE WHILE IN FLAT BAD: A LITTLE
WALKING IN HOSPITAL ROOM: A LITTLE
STANDING UP FROM CHAIR USING ARMS: A LITTLE
CLIMB 3 TO 5 STEPS WITH RAILING: A LITTLE
MOBILITY SCORE: 19
MOBILITY SCORE: 19
DAILY ACTIVITIY SCORE: 24
MOVING TO AND FROM BED TO CHAIR: A LITTLE
STANDING UP FROM CHAIR USING ARMS: A LITTLE

## 2024-01-20 ASSESSMENT — PAIN SCALES - GENERAL: PAINLEVEL_OUTOF10: 0 - NO PAIN

## 2024-01-20 ASSESSMENT — PAIN - FUNCTIONAL ASSESSMENT: PAIN_FUNCTIONAL_ASSESSMENT: 0-10

## 2024-01-20 NOTE — PROGRESS NOTES
SW contacted [facility] for update on transport delays. Pt now scheduled for transport at 11AM. Notified floor.    RAFY YUSUF

## 2024-01-20 NOTE — CARE PLAN
Problem: Pain - Adult  Goal: Verbalizes/displays adequate comfort level or baseline comfort level  Outcome: Met     Problem: Safety - Adult  Goal: Free from fall injury  Outcome: Met     Problem: Discharge Planning  Goal: Discharge to home or other facility with appropriate resources  Outcome: Met   The patient's goals for the shift include  comfort/pain management    The clinical goals for the shift include comfort

## 2024-01-20 NOTE — CARE PLAN
The patient's goals for the shift include  remain safe throughout the shift.    The clinical goals for the shift include comfort    Over the shift, the patient did not make progress toward the following goals. Barriers to progression include acute illness. Recommendations to address these barriers include communication.

## 2024-01-22 ENCOUNTER — TELEPHONE (OUTPATIENT)
Dept: SURGERY | Facility: CLINIC | Age: 74
End: 2024-01-22
Payer: MEDICARE

## 2024-01-22 NOTE — TELEPHONE ENCOUNTER
Attempted to reach the patient regarding an appointment with Dr. Altman for a hiatal hernia.  While trying to call, patients voicemail is full, and does not have mychart set up.

## 2024-01-26 LAB
ATRIAL RATE: 81 BPM
P AXIS: 45 DEGREES
PR INTERVAL: 155 MS
Q ONSET: 252 MS
QRS COUNT: 13 BEATS
QRS DURATION: 79 MS
QT INTERVAL: 385 MS
QTC CALCULATION(BAZETT): 453 MS
QTC FREDERICIA: 429 MS
R AXIS: 9 DEGREES
T AXIS: 65 DEGREES
T OFFSET: 444 MS
VENTRICULAR RATE: 83 BPM

## 2024-01-30 LAB
LAB AP ASR DISCLAIMER: NORMAL
LABORATORY COMMENT REPORT: NORMAL
PATH REPORT.FINAL DX SPEC: NORMAL
PATH REPORT.GROSS SPEC: NORMAL
PATH REPORT.RELEVANT HX SPEC: NORMAL
PATH REPORT.TOTAL CANCER: NORMAL

## 2024-02-21 NOTE — DISCHARGE SUMMARY
Discharge Diagnosis  General weakness    Issues Requiring Follow-Up  Follow-up with PCP in 2 weeks postdischarge.    Discharge Meds     Your medication list        START taking these medications        Instructions Last Dose Given Next Dose Due   HYDROcodone-acetaminophen 5-325 mg tablet  Commonly known as: Norco      Take 1 tablet by mouth 2 times a day for 7 days.       pantoprazole 40 mg EC tablet  Commonly known as: ProtoNix      Take 1 tablet (40 mg) by mouth 2 times a day before meals. Do not crush, chew, or split.              CONTINUE taking these medications        Instructions Last Dose Given Next Dose Due   acetaminophen 500 mg tablet  Commonly known as: Tylenol           albuterol 90 mcg/actuation inhaler           atorvastatin 40 mg tablet  Commonly known as: Lipitor           cetirizine 10 mg chewable tablet  Commonly known as: ZyrTEC           FLUoxetine 20 mg capsule  Commonly known as: PROzac           guaiFENesin 600 mg 12 hr tablet  Commonly known as: Mucinex           levothyroxine 150 mcg tablet  Commonly known as: Synthroid, Levoxyl           loperamide 2 mg capsule  Commonly known as: Imodium           ondansetron ODT 4 mg disintegrating tablet  Commonly known as: Zofran-ODT           oxybutynin XL 5 mg 24 hr tablet  Commonly known as: Ditropan-XL           polyethylene glycol 17 gram packet  Commonly known as: Glycolax, Miralax           QUEtiapine 25 mg tablet  Commonly known as: SEROquel           sennosides 8.6 mg tablet  Commonly known as: Senokot           sodium chloride 0.65 % nasal spray  Commonly known as: Ocean           tiZANidine 4 mg capsule  Commonly known as: Zanaflex           traZODone 150 mg tablet  Commonly known as: Desyrel                  STOP taking these medications      omeprazole 20 mg DR capsule  Commonly known as: PriLOSEC                  Where to Get Your Medications        You can get these medications from any pharmacy    Bring a paper prescription for each of  these medications  HYDROcodone-acetaminophen 5-325 mg tablet  pantoprazole 40 mg EC tablet         Test Results Pending At Discharge  Pending Labs       No current pending labs.            Hospital Cours  Expand All Collapse All    Show:  []Written[]Templated[]Copied    Added by:  [x]Roel Washington PA-C    []Trace for details  History Of Present Illness  Jada Marlow is a 73 y.o. female with past medical history significant for DJD, BLADE, peptic ulcer disease and hypothyroidism presents today with complaints of generalized weakness.  Has a history of hiatal hernia with schedule to follow-up with Dr. Altman.  States she has been having sternal pain for the past couple weeks.  States that while she was in the process of trying to find his office, she collapsed.  States she was walking all around trying to find his office and became so weak that this caused her knees to feel weak and for her to collapse.  Admits to loss of appetite, states she has not been able to eat or drink properly for the last 3 weeks due to this.  Admits to weight loss secondary to loss of appetite.  Anytime she drinks anything, she feels nauseous.  States she has had issues with acid reflux.  Patient lives at an assisted living facility.  Uses a rollator for assistance with ambulation.  Does not use oxygen.  Admits to occasional headaches.  Denies dizziness, lightheadedness, shortness of breath, abdominal pain or nausea, or fevers or chills.  Denies any pain or burning with urination.     ER course: On arrival, patient was afebrile and hemodynamically stable with an SpO2 96%.  Glucose 92.  Chloride 108.  BNP 88, troponin at 1655 was 7.  WBC 5.4.  Urinalysis shows yellow and clear color with trace leukocyte esterase.  CT abdomen pelvis results noted below.  CT chest results noted below.  Patient given IV Pepcid, IV morphine, and normal saline in the ED.      Post admission patient underwent EGD on 1/18/2024 that showed mild stenosis at the  GE junction with a stricture he had dilatation of the esophagus with no complications and following that patient was started on a diet and she tolerated that.  Patient was able to be discharged home on 1/19/2024 on oral PPIs.  Patient was going to follow-up with his own primary care doctor in [facility] as outpatient.       Pertinent Physical Exam At Time of Discharge  Physical Exam  Patient is fully awake and alert oriented x 3.  Feels improvement with swallowing following the procedure yesterday.        Auscultation    Heart regular S1-S2 abdomen soft and nontender    Extremities no edema  Outpatient Follow-Up  Future Appointments   Date Time Provider Department Center   3/18/2024  1:30 PM Leobardo Luong MD [id number] [place] Mayfield MD